# Patient Record
Sex: FEMALE | Race: WHITE | Employment: UNEMPLOYED | URBAN - METROPOLITAN AREA
[De-identification: names, ages, dates, MRNs, and addresses within clinical notes are randomized per-mention and may not be internally consistent; named-entity substitution may affect disease eponyms.]

---

## 2017-03-08 PROBLEM — R61 HYPERHIDROSIS: Status: ACTIVE | Noted: 2017-03-08

## 2017-03-15 PROCEDURE — 87624 HPV HI-RISK TYP POOLED RSLT: CPT | Performed by: OBSTETRICS & GYNECOLOGY

## 2017-03-15 PROCEDURE — 88175 CYTOPATH C/V AUTO FLUID REDO: CPT | Performed by: OBSTETRICS & GYNECOLOGY

## 2017-10-19 PROCEDURE — 87086 URINE CULTURE/COLONY COUNT: CPT | Performed by: NURSE PRACTITIONER

## 2017-11-10 PROCEDURE — 83001 ASSAY OF GONADOTROPIN (FSH): CPT | Performed by: OBSTETRICS & GYNECOLOGY

## 2017-11-10 PROCEDURE — 84146 ASSAY OF PROLACTIN: CPT | Performed by: OBSTETRICS & GYNECOLOGY

## 2017-11-28 PROCEDURE — 84144 ASSAY OF PROGESTERONE: CPT | Performed by: OBSTETRICS & GYNECOLOGY

## 2017-11-28 PROCEDURE — 36415 COLL VENOUS BLD VENIPUNCTURE: CPT | Performed by: OBSTETRICS & GYNECOLOGY

## 2018-02-09 PROBLEM — Z34.00 SUPERVISION OF NORMAL FIRST PREGNANCY, ANTEPARTUM: Status: ACTIVE | Noted: 2018-02-09

## 2018-02-09 PROBLEM — Z34.00 SUPERVISION OF NORMAL FIRST PREGNANCY, ANTEPARTUM (HCC): Status: ACTIVE | Noted: 2018-02-09

## 2018-02-09 PROCEDURE — 87591 N.GONORRHOEAE DNA AMP PROB: CPT | Performed by: OBSTETRICS & GYNECOLOGY

## 2018-02-09 PROCEDURE — 87491 CHLMYD TRACH DNA AMP PROBE: CPT | Performed by: OBSTETRICS & GYNECOLOGY

## 2018-02-16 PROCEDURE — 87389 HIV-1 AG W/HIV-1&-2 AB AG IA: CPT | Performed by: OBSTETRICS & GYNECOLOGY

## 2018-02-16 PROCEDURE — 85025 COMPLETE CBC W/AUTO DIFF WBC: CPT | Performed by: OBSTETRICS & GYNECOLOGY

## 2018-02-16 PROCEDURE — 86900 BLOOD TYPING SEROLOGIC ABO: CPT | Performed by: OBSTETRICS & GYNECOLOGY

## 2018-02-16 PROCEDURE — 86850 RBC ANTIBODY SCREEN: CPT | Performed by: OBSTETRICS & GYNECOLOGY

## 2018-02-16 PROCEDURE — 86901 BLOOD TYPING SEROLOGIC RH(D): CPT | Performed by: OBSTETRICS & GYNECOLOGY

## 2018-02-16 PROCEDURE — 86780 TREPONEMA PALLIDUM: CPT | Performed by: OBSTETRICS & GYNECOLOGY

## 2018-02-16 PROCEDURE — 36415 COLL VENOUS BLD VENIPUNCTURE: CPT | Performed by: OBSTETRICS & GYNECOLOGY

## 2018-02-16 PROCEDURE — 87086 URINE CULTURE/COLONY COUNT: CPT | Performed by: OBSTETRICS & GYNECOLOGY

## 2018-02-16 PROCEDURE — 86762 RUBELLA ANTIBODY: CPT | Performed by: OBSTETRICS & GYNECOLOGY

## 2018-02-16 PROCEDURE — 87340 HEPATITIS B SURFACE AG IA: CPT | Performed by: OBSTETRICS & GYNECOLOGY

## 2018-02-23 PROBLEM — Z67.91 RH NEGATIVE STATUS DURING PREGNANCY (HCC): Status: ACTIVE | Noted: 2018-02-23

## 2018-02-23 PROBLEM — Z67.91 RH NEGATIVE STATUS DURING PREGNANCY: Status: ACTIVE | Noted: 2018-02-23

## 2018-02-23 PROBLEM — O26.899 RH NEGATIVE STATUS DURING PREGNANCY (HCC): Status: ACTIVE | Noted: 2018-02-23

## 2018-02-23 PROBLEM — O26.899 RH NEGATIVE STATUS DURING PREGNANCY: Status: ACTIVE | Noted: 2018-02-23

## 2018-06-28 PROCEDURE — 82950 GLUCOSE TEST: CPT | Performed by: OBSTETRICS & GYNECOLOGY

## 2018-06-28 PROCEDURE — 87389 HIV-1 AG W/HIV-1&-2 AB AG IA: CPT | Performed by: OBSTETRICS & GYNECOLOGY

## 2018-06-28 PROCEDURE — 86780 TREPONEMA PALLIDUM: CPT | Performed by: OBSTETRICS & GYNECOLOGY

## 2018-06-28 PROCEDURE — 86850 RBC ANTIBODY SCREEN: CPT | Performed by: OBSTETRICS & GYNECOLOGY

## 2018-07-06 PROCEDURE — 82951 GLUCOSE TOLERANCE TEST (GTT): CPT | Performed by: OBSTETRICS & GYNECOLOGY

## 2018-07-06 PROCEDURE — 36415 COLL VENOUS BLD VENIPUNCTURE: CPT | Performed by: OBSTETRICS & GYNECOLOGY

## 2018-07-06 PROCEDURE — 82952 GTT-ADDED SAMPLES: CPT | Performed by: OBSTETRICS & GYNECOLOGY

## 2018-09-08 ENCOUNTER — HOSPITAL ENCOUNTER (OUTPATIENT)
Facility: HOSPITAL | Age: 33
Setting detail: OBSERVATION
Discharge: HOME | End: 2018-09-08
Attending: OBSTETRICS & GYNECOLOGY | Admitting: OBSTETRICS & GYNECOLOGY
Payer: COMMERCIAL

## 2018-09-08 VITALS
DIASTOLIC BLOOD PRESSURE: 76 MMHG | BODY MASS INDEX: 29.38 KG/M2 | TEMPERATURE: 98 F | SYSTOLIC BLOOD PRESSURE: 136 MMHG | RESPIRATION RATE: 20 BRPM | HEART RATE: 85 BPM | HEIGHT: 66.5 IN | WEIGHT: 185 LBS

## 2018-09-08 PROBLEM — Z34.90 PREGNANCY: Status: ACTIVE | Noted: 2018-09-08

## 2018-09-08 PROBLEM — Z34.90 PREGNANCY (HCC): Status: ACTIVE | Noted: 2018-09-08

## 2018-09-08 PROCEDURE — 84112 EVAL AMNIOTIC FLUID PROTEIN: CPT

## 2018-09-08 PROCEDURE — 59025 FETAL NON-STRESS TEST: CPT

## 2018-09-08 PROCEDURE — 99204 OFFICE O/P NEW MOD 45 MIN: CPT

## 2018-09-08 NOTE — PROGRESS NOTES
Sterile speculum exam revealed no fluid, no pooling. Amniotest and ferning negative. ROM+ negative. Discharge instructions given to pt and spouse, all questions answered. Pt discharged home via ambulatory in stable condition.

## 2018-09-08 NOTE — NST
Nonstress Test   Patient: Otoniel Barthel    Gestation: 37w2d    NST:       Variability: Moderate           Accelerations: Yes           Decelerations: None            Baseline: 125 BPM           Uterine Irritability: No           Contractions: Raj Stephenson

## 2018-10-02 ENCOUNTER — TELEPHONE (OUTPATIENT)
Dept: OBGYN UNIT | Facility: HOSPITAL | Age: 33
End: 2018-10-02

## 2018-10-03 ENCOUNTER — APPOINTMENT (OUTPATIENT)
Dept: OBGYN CLINIC | Facility: HOSPITAL | Age: 33
End: 2018-10-03
Payer: COMMERCIAL

## 2018-10-03 ENCOUNTER — ANESTHESIA EVENT (OUTPATIENT)
Dept: OBGYN UNIT | Facility: HOSPITAL | Age: 33
End: 2018-10-03
Payer: COMMERCIAL

## 2018-10-03 ENCOUNTER — ANESTHESIA (OUTPATIENT)
Dept: OBGYN UNIT | Facility: HOSPITAL | Age: 33
End: 2018-10-03
Payer: COMMERCIAL

## 2018-10-03 ENCOUNTER — HOSPITAL ENCOUNTER (INPATIENT)
Facility: HOSPITAL | Age: 33
LOS: 3 days | Discharge: HOME | End: 2018-10-06
Attending: OBSTETRICS & GYNECOLOGY | Admitting: OBSTETRICS & GYNECOLOGY
Payer: COMMERCIAL

## 2018-10-03 PROCEDURE — 59514 CESAREAN DELIVERY ONLY: CPT | Performed by: OBSTETRICS & GYNECOLOGY

## 2018-10-03 PROCEDURE — 3E0P7VZ INTRODUCTION OF HORMONE INTO FEMALE REPRODUCTIVE, VIA NATURAL OR ARTIFICIAL OPENING: ICD-10-PCS | Performed by: OBSTETRICS & GYNECOLOGY

## 2018-10-03 PROCEDURE — 3E033VJ INTRODUCTION OF OTHER HORMONE INTO PERIPHERAL VEIN, PERCUTANEOUS APPROACH: ICD-10-PCS | Performed by: OBSTETRICS & GYNECOLOGY

## 2018-10-03 RX ORDER — ONDANSETRON 2 MG/ML
4 INJECTION INTRAMUSCULAR; INTRAVENOUS EVERY 6 HOURS PRN
Status: DISCONTINUED | OUTPATIENT
Start: 2018-10-03 | End: 2018-10-06

## 2018-10-03 RX ORDER — NALOXONE HYDROCHLORIDE 0.4 MG/ML
0.08 INJECTION, SOLUTION INTRAMUSCULAR; INTRAVENOUS; SUBCUTANEOUS
Status: ACTIVE | OUTPATIENT
Start: 2018-10-03 | End: 2018-10-04

## 2018-10-03 RX ORDER — TERBUTALINE SULFATE 1 MG/ML
0.25 INJECTION, SOLUTION SUBCUTANEOUS AS NEEDED
Status: DISCONTINUED | OUTPATIENT
Start: 2018-10-03 | End: 2018-10-03 | Stop reason: HOSPADM

## 2018-10-03 RX ORDER — DEXTROSE, SODIUM CHLORIDE, SODIUM LACTATE, POTASSIUM CHLORIDE, AND CALCIUM CHLORIDE 5; .6; .31; .03; .02 G/100ML; G/100ML; G/100ML; G/100ML; G/100ML
INJECTION, SOLUTION INTRAVENOUS AS NEEDED
Status: DISCONTINUED | OUTPATIENT
Start: 2018-10-03 | End: 2018-10-03 | Stop reason: HOSPADM

## 2018-10-03 RX ORDER — ZOLPIDEM TARTRATE 5 MG/1
5 TABLET ORAL NIGHTLY PRN
Status: DISCONTINUED | OUTPATIENT
Start: 2018-10-03 | End: 2018-10-03 | Stop reason: HOSPADM

## 2018-10-03 RX ORDER — KETOROLAC TROMETHAMINE 30 MG/ML
30 INJECTION, SOLUTION INTRAMUSCULAR; INTRAVENOUS EVERY 6 HOURS PRN
Status: DISCONTINUED | OUTPATIENT
Start: 2018-10-04 | End: 2018-10-04 | Stop reason: ALTCHOICE

## 2018-10-03 RX ORDER — METOCLOPRAMIDE HYDROCHLORIDE 5 MG/ML
10 INJECTION INTRAMUSCULAR; INTRAVENOUS EVERY 6 HOURS PRN
Status: DISCONTINUED | OUTPATIENT
Start: 2018-10-03 | End: 2018-10-06

## 2018-10-03 RX ORDER — MEPERIDINE HYDROCHLORIDE 25 MG/ML
12.5 INJECTION INTRAMUSCULAR; INTRAVENOUS; SUBCUTANEOUS ONCE AS NEEDED
Status: COMPLETED | OUTPATIENT
Start: 2018-10-03 | End: 2018-10-03

## 2018-10-03 RX ORDER — SODIUM CHLORIDE, SODIUM LACTATE, POTASSIUM CHLORIDE, CALCIUM CHLORIDE 600; 310; 30; 20 MG/100ML; MG/100ML; MG/100ML; MG/100ML
INJECTION, SOLUTION INTRAVENOUS CONTINUOUS
Status: DISCONTINUED | OUTPATIENT
Start: 2018-10-03 | End: 2018-10-06

## 2018-10-03 RX ORDER — CEFAZOLIN SODIUM 1 G/3ML
INJECTION, POWDER, FOR SOLUTION INTRAMUSCULAR; INTRAVENOUS
Status: DISCONTINUED | OUTPATIENT
Start: 2018-10-03 | End: 2018-10-03 | Stop reason: HOSPADM

## 2018-10-03 RX ORDER — DIPHENHYDRAMINE HYDROCHLORIDE 50 MG/ML
25 INJECTION INTRAMUSCULAR; INTRAVENOUS ONCE AS NEEDED
Status: DISCONTINUED | OUTPATIENT
Start: 2018-10-03 | End: 2018-10-03 | Stop reason: HOSPADM

## 2018-10-03 RX ORDER — DIPHENHYDRAMINE HCL 25 MG
25 CAPSULE ORAL EVERY 4 HOURS PRN
Status: DISCONTINUED | OUTPATIENT
Start: 2018-10-03 | End: 2018-10-06

## 2018-10-03 RX ORDER — NALBUPHINE HCL 10 MG/ML
2.5 AMPUL (ML) INJECTION EVERY 4 HOURS PRN
Status: DISCONTINUED | OUTPATIENT
Start: 2018-10-03 | End: 2018-10-06

## 2018-10-03 RX ORDER — KETOROLAC TROMETHAMINE 30 MG/ML
30 INJECTION, SOLUTION INTRAMUSCULAR; INTRAVENOUS ONCE AS NEEDED
Status: COMPLETED | OUTPATIENT
Start: 2018-10-03 | End: 2018-10-03

## 2018-10-03 RX ORDER — CEFAZOLIN SODIUM/WATER 2 G/20 ML
2 SYRINGE (ML) INTRAVENOUS
Status: DISCONTINUED | OUTPATIENT
Start: 2018-10-03 | End: 2018-10-03 | Stop reason: HOSPADM

## 2018-10-03 RX ORDER — CEFAZOLIN SODIUM/WATER 2 G/20 ML
SYRINGE (ML) INTRAVENOUS
Status: DISPENSED
Start: 2018-10-03 | End: 2018-10-04

## 2018-10-03 RX ORDER — IBUPROFEN 600 MG/1
600 TABLET ORAL ONCE AS NEEDED
Status: DISCONTINUED | OUTPATIENT
Start: 2018-10-03 | End: 2018-10-03 | Stop reason: HOSPADM

## 2018-10-03 RX ORDER — SODIUM CHLORIDE, SODIUM LACTATE, POTASSIUM CHLORIDE, CALCIUM CHLORIDE 600; 310; 30; 20 MG/100ML; MG/100ML; MG/100ML; MG/100ML
INJECTION, SOLUTION INTRAVENOUS CONTINUOUS
Status: DISCONTINUED | OUTPATIENT
Start: 2018-10-03 | End: 2018-10-03 | Stop reason: HOSPADM

## 2018-10-03 RX ORDER — MORPHINE SULFATE 0.5 MG/ML
0.15 INJECTION, SOLUTION EPIDURAL; INTRATHECAL; INTRAVENOUS ONCE
Status: DISCONTINUED | OUTPATIENT
Start: 2018-10-03 | End: 2018-10-06

## 2018-10-03 RX ORDER — KETOROLAC TROMETHAMINE 30 MG/ML
INJECTION, SOLUTION INTRAMUSCULAR; INTRAVENOUS
Status: DISPENSED
Start: 2018-10-03 | End: 2018-10-04

## 2018-10-03 RX ORDER — TRISODIUM CITRATE DIHYDRATE AND CITRIC ACID MONOHYDRATE 500; 334 MG/5ML; MG/5ML
30 SOLUTION ORAL AS NEEDED
Status: DISCONTINUED | OUTPATIENT
Start: 2018-10-03 | End: 2018-10-03 | Stop reason: HOSPADM

## 2018-10-03 RX ORDER — DIPHENHYDRAMINE HYDROCHLORIDE 50 MG/ML
12.5 INJECTION INTRAMUSCULAR; INTRAVENOUS EVERY 4 HOURS PRN
Status: DISCONTINUED | OUTPATIENT
Start: 2018-10-03 | End: 2018-10-06

## 2018-10-03 RX ORDER — MORPHINE SULFATE 4 MG/ML
2 INJECTION, SOLUTION INTRAMUSCULAR; INTRAVENOUS EVERY 5 MIN PRN
Status: DISCONTINUED | OUTPATIENT
Start: 2018-10-03 | End: 2018-10-03 | Stop reason: HOSPADM

## 2018-10-03 RX ORDER — MORPHINE SULFATE 4 MG/ML
2 INJECTION, SOLUTION INTRAMUSCULAR; INTRAVENOUS EVERY 2 HOUR PRN
Status: ACTIVE | OUTPATIENT
Start: 2018-10-03 | End: 2018-10-04

## 2018-10-03 RX ORDER — ONDANSETRON 2 MG/ML
4 INJECTION INTRAMUSCULAR; INTRAVENOUS ONCE AS NEEDED
Status: DISCONTINUED | OUTPATIENT
Start: 2018-10-03 | End: 2018-10-03 | Stop reason: HOSPADM

## 2018-10-03 RX ORDER — NALBUPHINE HCL 10 MG/ML
2.5 AMPUL (ML) INJECTION
Status: DISCONTINUED | OUTPATIENT
Start: 2018-10-03 | End: 2018-10-03 | Stop reason: HOSPADM

## 2018-10-03 NOTE — PROGRESS NOTES
Pt is a 28year old female admitted to TRG2/TRG2-A, Patient presents with:  R/o Rom: \"This morning I noticed some leaking of cl fld. \"     Pt is 40w6d intra-uterine pregnancy. Reports +fetal movement. History obtained, consents signed.  Oriented to room,

## 2018-10-03 NOTE — PROGRESS NOTES
[facility]      Labor Progress Note    Jennie James Patient Status:  Inpatient    1985 MRN [id number]   Location [address] [facility] Attending Beena Pacheco MD   Hosp Day # 0 PCP Kevin Charles MD      SUBJECTIVE:    In

## 2018-10-03 NOTE — PROGRESS NOTES
[facility]      Labor Progress Note    Lum Barthel Patient Status:  Inpatient    1985 MRN [id number]   Location 1818 [facility] Attending Sai Pacheco MD   Hosp Day # 0 PCP Chica Degroot MD      SUBJECTIVE:    In

## 2018-10-04 PROCEDURE — 3E0234Z INTRODUCTION OF SERUM, TOXOID AND VACCINE INTO MUSCLE, PERCUTANEOUS APPROACH: ICD-10-PCS | Performed by: OBSTETRICS & GYNECOLOGY

## 2018-10-04 RX ORDER — HYDROCODONE BITARTRATE AND ACETAMINOPHEN 5; 325 MG/1; MG/1
1 TABLET ORAL EVERY 4 HOURS PRN
Status: DISCONTINUED | OUTPATIENT
Start: 2018-10-04 | End: 2018-10-06

## 2018-10-04 RX ORDER — SIMETHICONE 80 MG
80 TABLET,CHEWABLE ORAL 3 TIMES DAILY PRN
Status: DISCONTINUED | OUTPATIENT
Start: 2018-10-04 | End: 2018-10-06

## 2018-10-04 RX ORDER — ZOLPIDEM TARTRATE 5 MG/1
5 TABLET ORAL NIGHTLY PRN
Status: DISCONTINUED | OUTPATIENT
Start: 2018-10-04 | End: 2018-10-06

## 2018-10-04 RX ORDER — DOCUSATE SODIUM 100 MG/1
100 CAPSULE, LIQUID FILLED ORAL
Status: DISCONTINUED | OUTPATIENT
Start: 2018-10-04 | End: 2018-10-06

## 2018-10-04 RX ORDER — DEXTROSE, SODIUM CHLORIDE, SODIUM LACTATE, POTASSIUM CHLORIDE, AND CALCIUM CHLORIDE 5; .6; .31; .03; .02 G/100ML; G/100ML; G/100ML; G/100ML; G/100ML
INJECTION, SOLUTION INTRAVENOUS CONTINUOUS
Status: DISCONTINUED | OUTPATIENT
Start: 2018-10-04 | End: 2018-10-06

## 2018-10-04 RX ORDER — HYDROCODONE BITARTRATE AND ACETAMINOPHEN 10; 325 MG/1; MG/1
1 TABLET ORAL EVERY 4 HOURS PRN
Status: DISCONTINUED | OUTPATIENT
Start: 2018-10-04 | End: 2018-10-06

## 2018-10-04 RX ORDER — IBUPROFEN 600 MG/1
600 TABLET ORAL EVERY 6 HOURS SCHEDULED
Status: DISCONTINUED | OUTPATIENT
Start: 2018-10-05 | End: 2018-10-04

## 2018-10-04 RX ORDER — BISACODYL 10 MG
10 SUPPOSITORY, RECTAL RECTAL
Status: DISCONTINUED | OUTPATIENT
Start: 2018-10-04 | End: 2018-10-06

## 2018-10-04 RX ORDER — IBUPROFEN 600 MG/1
600 TABLET ORAL EVERY 6 HOURS SCHEDULED
Status: DISCONTINUED | OUTPATIENT
Start: 2018-10-04 | End: 2018-10-06

## 2018-10-04 RX ORDER — DOCUSATE SODIUM 100 MG/1
100 CAPSULE, LIQUID FILLED ORAL
Status: DISCONTINUED | OUTPATIENT
Start: 2018-10-05 | End: 2018-10-04

## 2018-10-04 NOTE — PROGRESS NOTES
[facility]    Patients Name: Manuel Franks  Attending Physician: Sal Gongora MD  CSN: [id number]    Location:  6222/2398-T  MRN: [id number]    YOB: 1985  Admission Date: 10/3/2018     Obstetric Anesthesia Pain Progress Note

## 2018-10-04 NOTE — OPERATIVE REPORT
[facility]  Post-Operative Note    Berenice Winters Patient Status:  Inpatient    1985 MRN [id number]   Location 1818 [facility] Attending Vangie Pacheco MD   Hosp Day # 0 PCP Andria Alvarez MD     Pre-operative Diagnosi The incision was extended using blunt dissection. The infant's head was delivered atraumatically. The remainder of the infant was delivered without difficulty. After the umbilical cord was clamped and cut. The cord blood was obtained for evaluation.  The

## 2018-10-04 NOTE — PROGRESS NOTES
NURSING ADMISSION NOTE      Patient admitted via Cart  Oriented to room. Safety precautions initiated. Bed in low position. Call light in reach. Discussed plan of care. Instructed to call RN for assistance and concerns.

## 2018-10-04 NOTE — ANESTHESIA PREPROCEDURE EVALUATION
PRE-OP EVALUATION    Patient Name: Faisal Marroquin    Pre-op Diagnosis: Intrauterine pregnancy  Fetal intolerance to labor    Procedure(s): Primary  Section      Surgeon(s) and Role:     * Sanjeev Gruber MD - Primary     * Carlos Borges Past Surgical History:  04/08/2011: COLPOSCOPY, CERVIX W/UPPER ADJACENT VAGINA; W/  ENDOCERVICAL CURETTAGE      Comment:   CIN1  08/2016: ORAL SURGERY SINGLE TOOTH      Comment:  wisdom tooth  Social History    Tobacco Use      Smo

## 2018-10-04 NOTE — PROGRESS NOTES
Pt transferred to Mother Baby room 2192 in stable condition. Report given to Alex, [place] Infant transferred with mother in stable condition.

## 2018-10-04 NOTE — ANESTHESIA POSTPROCEDURE EVALUATION
[facility] Milo Haggard Patient Status:  Inpatient   Age/Gender 28year old female MRN [id number]   Location 1818 [facility] Attending Lianna Pacheco MD   Hosp Day # 0 PCP Bobby Patterson MD       Anesthesia Post-op Note

## 2018-10-04 NOTE — PROGRESS NOTES
[facility]  Post-Partum Caesarean Section Progress Note    Cam Flavors Patient Status:  Inpatient    1985 MRN [id number]   [facility] 2SW-J Attending Yumi Ames MD   Hosp Day # 1 PCP Kalee Cherry MD     SUBJECTIV

## 2018-10-05 NOTE — PLAN OF CARE
POSTPARTUM    • Long Term Goal:Experiences normal postpartum course Progressing    • Optimize infant feeding at the breast Progressing    • Appropriate maternal -  bonding Progressing            Discussed POC with family, verbalized understanding

## 2018-10-05 NOTE — PROGRESS NOTES
S: pt without complaints.   Positive flatus  O: VS-Temp:  [98.2 °F (36.8 °C)-99.8 °F (37.7 °C)] 98.6 °F (37 °C)  Pulse:  [] 83  Resp:  [17-18] 17  BP: (121-134)/(66-73) 121/67       Abdomen: soft, ND, NT  Incision- CDI       Extremities: tr edema, NT

## 2018-10-05 NOTE — PLAN OF CARE
POSTPARTUM    • Establishment of adequate milk supply with medication/procedure interruptions Completed          POSTPARTUM    • Long Term Goal:Experiences normal postpartum course Progressing    • Optimize infant feeding at the breast Progressing    • Irena

## 2018-10-06 VITALS
HEART RATE: 75 BPM | BODY MASS INDEX: 29.89 KG/M2 | HEIGHT: 66 IN | TEMPERATURE: 99 F | OXYGEN SATURATION: 100 % | DIASTOLIC BLOOD PRESSURE: 80 MMHG | SYSTOLIC BLOOD PRESSURE: 130 MMHG | RESPIRATION RATE: 17 BRPM | WEIGHT: 186 LBS

## 2018-10-06 RX ORDER — HYDROCODONE BITARTRATE AND ACETAMINOPHEN 5; 325 MG/1; MG/1
1 TABLET ORAL EVERY 8 HOURS PRN
Qty: 20 TABLET | Refills: 0 | Status: SHIPPED | OUTPATIENT
Start: 2018-10-06 | End: 2018-10-17

## 2018-10-06 NOTE — DISCHARGE SUMMARY
[facility]  Discharge Summary    Faisal Marroquin Patient Status:  Inpatient    1985 MRN [id number]   [facility] [place] 2SW-J Attending Gloria Pacheco MD   Hosp Day # 3 PCP Lotus Smith MD     Date of Admission: 10/3/2018    Akhil

## 2018-10-06 NOTE — PROGRESS NOTES
[facility]  Post-Partum Caesarean Section Progress Note    Fabianamarylin Ramaner Patient Status:  Inpatient    1985 MRN [id number]   [facility] 2SW-J Attending Mary Hartman MD   Hosp Day # 3 PCP Elidia Denver, MD     SUBJECTIV

## 2018-10-09 ENCOUNTER — TELEPHONE (OUTPATIENT)
Dept: OBGYN UNIT | Facility: HOSPITAL | Age: 33
End: 2018-10-09

## 2018-10-09 NOTE — PROGRESS NOTES
Reviewed self and infant care w / mom, she verbalizes understanding of instructions reviewed. Encourage to follow up w/ MDs as directed and w/ questions/concerns. Enc to go to ct, all sx of PIH reviewed w good understanding.

## 2019-05-16 PROBLEM — Z34.00 SUPERVISION OF NORMAL FIRST PREGNANCY, ANTEPARTUM: Status: RESOLVED | Noted: 2018-02-09 | Resolved: 2019-05-16

## 2019-05-16 PROBLEM — Z34.00 SUPERVISION OF NORMAL FIRST PREGNANCY, ANTEPARTUM (HCC): Status: RESOLVED | Noted: 2018-02-09 | Resolved: 2019-05-16

## 2021-02-16 PROBLEM — Z34.90 PREGNANCY (HCC): Status: RESOLVED | Noted: 2018-09-08 | Resolved: 2021-02-16

## 2021-02-16 PROBLEM — Z34.90 PREGNANCY: Status: RESOLVED | Noted: 2018-09-08 | Resolved: 2021-02-16

## 2021-02-16 PROBLEM — F41.9 ANXIETY: Status: ACTIVE | Noted: 2021-02-16

## 2023-04-27 ENCOUNTER — LAB REQUISITION (OUTPATIENT)
Dept: LAB | Facility: HOSPITAL | Age: 38
End: 2023-04-27
Payer: COMMERCIAL

## 2023-04-27 DIAGNOSIS — N60.81 OTHER BENIGN MAMMARY DYSPLASIAS OF RIGHT BREAST: ICD-10-CM

## 2023-04-27 PROCEDURE — 88304 TISSUE EXAM BY PATHOLOGIST: CPT | Performed by: SURGERY

## 2024-03-06 LAB
AMB EXT HEPATITIS C VIRUS ANTIBODY: NEGATIVE
ANTIBODY SCREEN OB: POSITIVE
HEPATITIS B SURFACE ANTIGEN OB: NEGATIVE
HIV RESULT OB: NEGATIVE
RH FACTOR OB: NEGATIVE
T PALLIDUM ANTIBODIES: NEGATIVE

## 2024-05-15 ENCOUNTER — OFFICE VISIT (OUTPATIENT)
Dept: PERINATAL CARE | Facility: HOSPITAL | Age: 39
End: 2024-05-15
Attending: OBSTETRICS & GYNECOLOGY

## 2024-05-15 VITALS
HEIGHT: 67 IN | HEART RATE: 91 BPM | SYSTOLIC BLOOD PRESSURE: 106 MMHG | WEIGHT: 152 LBS | DIASTOLIC BLOOD PRESSURE: 64 MMHG | BODY MASS INDEX: 23.86 KG/M2

## 2024-05-15 DIAGNOSIS — O34.219 HISTORY OF CESAREAN DELIVERY AFFECTING PREGNANCY (HCC): ICD-10-CM

## 2024-05-15 DIAGNOSIS — O09.529 AMA (ADVANCED MATERNAL AGE) MULTIGRAVIDA 35+ (HCC): ICD-10-CM

## 2024-05-15 DIAGNOSIS — O09.522 MULTIGRAVIDA OF ADVANCED MATERNAL AGE IN SECOND TRIMESTER (HCC): Primary | ICD-10-CM

## 2024-05-15 PROCEDURE — 76811 OB US DETAILED SNGL FETUS: CPT | Performed by: OBSTETRICS & GYNECOLOGY

## 2024-05-15 NOTE — PROGRESS NOTES
Outpatient Maternal-Fetal Medicine Consultation    Dear Dr. Ramos    Thank you for requesting ultrasound evaluation and maternal fetal medicine consultation on your patient Jeaneth Connelly.  As you are aware she is a 39 year old female  with a morejon pregnancy and an Estimated Date of Delivery: 24.  A maternal-fetal medicine consultation was requested secondary to Advanced Maternal Age.  Her prenatal records and labs were reviewed.    ROS    HISTORY  OB History    Para Term  AB Living   2 1 1 0 0 1   SAB IAB Ectopic Multiple Live Births   0 0 0 0 1      # Outcome Date GA Lbr Huy/2nd Weight Sex Type Anes PTL Lv   2 Current            1 Term 10/03/18 40w6d  9 lb 2 oz (4.14 kg) M CS-LTranv Spinal N PRASANNA      Complications: Fetal intolerance to labor       Allergies:  Allergies   Allergen Reactions    Clindamycin HIVES    Dicloxacillin DIARRHEA      Current Meds:  Current Outpatient Medications   Medication Sig Dispense Refill    Prenatal Vit w/Ev-Daalpyxuk-HR (PNV OR) Take by mouth daily.      Ergocalciferol (VITAMIN D OR) Take by mouth. (Patient not taking: Reported on 5/15/2024)      Clobetasol Propionate 0.05 % External Cream Apply to AA's of hands and feet BID x 2 weeks. Hold x 1 week. Restart PRN. (Patient not taking: Reported on 2021 ) 60 g 2        HISTORY:  Past Medical History:    Chlamydia    SARAH I (cervical intraepithelial neoplasia I)    Frequent UTI    LGSIL on Pap smear of cervix      Past Surgical History:   Procedure Laterality Date      10/03/2018    Colposcopy,bx cervix/endocerv curr  2011     CIN1    [place] teeth removed  2016      Family History   Problem Relation Age of Onset    Other (Thyroid Cancer [Other]) Father 60    Hypertension Father     Breast Cancer Maternal Grandmother         dx late 60's    Other (Hodgkin's Lymphoma [Other]) Mother 33    Depression Mother         depression    Stroke Maternal Grandfather 39        Comp from back  surgery    Skin cancer Paternal Grandfather 86    Heart Disorder Paternal Grandfather 80        Heart attack    Hypertension Paternal Grandfather     Other (Stomach Cancer [Other]) Paternal Grandfather       Social History     Socioeconomic History    Marital status:    Occupational History    Occupation: national response specialist   Tobacco Use    Smoking status: Former     Current packs/day: 0.00     Types: Cigarettes     Start date: 1/1/2007     Quit date: 1/1/2009     Years since quitting: 15.3    Smokeless tobacco: Never   Vaping Use    Vaping status: Never Used   Substance and Sexual Activity    Alcohol use: No     Alcohol/week: 6.0 standard drinks of alcohol     Types: 6 Standard drinks or equivalent per week    Drug use: No    Sexual activity: Not Currently     Partners: Male   Other Topics Concern    Blood Transfusions No    Caffeine Concern No     Comment: 3 cups per day    Sleep Concern No    Stress Concern No    Weight Concern No    Exercise Yes    Self-Exams Yes     Social Determinants of Health      Received from [facility]    Housing Stability          PHYSICAL EXAMINATION:  /64 (BP Location: Right arm, Patient Position: Sitting, Cuff Size: adult)   Pulse 91   Ht 5' 7\" (1.702 m)   Wt 152 lb (68.9 kg)   BMI 23.81 kg/m²   Physical Exam  Constitutional:       Appearance: Normal appearance.   Abdominal:      Palpations: Abdomen is soft.      Tenderness: There is no abdominal tenderness.   Neurological:      Mental Status: She is alert.   Psychiatric:         Mood and Affect: Mood normal.         Behavior: Behavior normal.         OBSTETRIC ULTRASOUND  The patient had a level II ultrasound today which revealed size consistent with dates and a normal detailed anatomic survey.  Ultrasound Findings:  Single IUP in breech presentation.    Placenta is posterior.   A 3 vessel cord is noted.  Cardiac activity is present at 156 bpm   g ( 1 lb 0 oz);   MVP is 3.9 cm .      The fetal measurements are consistent with the established EDC. No ultrasound evidence of structural abnormalities are seen today. The nasal bone is present. No ultrasound evidence of markers for aneuploidy are seen. She understands that ultrasound exam cannot exclude genetic abnormalities and that genetic testing is recommended. The limitations of ultrasound were discussed.     Uterus and adnexa appeared normal  today on US  See PACS/Imaging Tab For Complete Ultrasound Report  I interpreted the results and reviewed them with the patient.    DISCUSSION  During her visit we discussed and reviewed the following issues:  ADVANCED MATERNAL AGE    Background  I reviewed with the patient that pregnancies in women of advanced maternal age (35 or older at delivery) are associated with elevated risks. Specifically, there is a higher rate of:  Fetal malformations  Preeclampsia  Gestational diabetes  Intrauterine fetal death    As a result, enhanced pregnancy surveillance is advised for these patients including a comprehensive ultrasound to assess for fetal malformations (at 20 weeks) and a third trimester ultrasound assessment for fetal growth (at 32 weeks). In addition, weekly NST's (initiating at 36 weeks gestation for women 35-39 years and at 32 weeks gestation for women 40 years and older) are also advised. Routine obstetric care is more than adequate to assess for gestational diabetes and preeclampsia; hence, no further significant alterations in obstetric care are advised.    Medical Complications    Women 35 years of age or older can expect to experience two to three fold higher rates of hospitalization,  delivery, and pregnancy-related complications when compared to their younger counterparts.  The two most common medical problems complicating these  pregnanccies are hypertension and diabetes.   The incidence of preeclampsia in the general obstetric population is 3 to 4 percent; this increases to 5 to 10  percent in women over age 40 and is as high as 35 percent in women over age 50.   The incidence of gestational diabetes in the general obstetric population is 3 percent, rising to 7 to 12 percent in women over age 40 and 20 percent in women over age 50.  Women 35 years of age or older are more likely to be delivered by . The  delivery rate in the general obstetric population of the [place] is almost 30 percent, compared to almost 50 percent in women over age 40 to 45 and almost 80 percent in women age 50 to 63.          Fetal Death        A decision analysis tool using data from the [place] [employer]  Database predicted a strategy of weekly antepartum testing and labor induction would lower the risk of unexplained fetal death in women 35 years of age or older. In this model, weekly testing starting at 36 weeks of gestation would drop the risk of fetal death from 5.2 to 1.3 per 1000 pregnancies. While a policy of antepartum testing in older women does increase the chance that a women will be induced (71 inductions per fetal death averted) and thereby increases her risk of having a  delivery, only 14 additional cesareans would need to be performed to avert one unexplained fetal death.  Hence, weekly NST's are advised for women of advanced maternal age; testing should be initiated at 36 weeks for women 35-39 years and at 32 weeks for women 40 years and older.    Fetal Malformations    Cardiac malformations, clubfoot, and diaphragmatic hernia appear to occur with increased frequency in offspring of older women. These abnormalities are structural and unrelated to aneuploidy, thus they would not be detected by karyotype analysis.  For these reasons a complete, detailed ultrasound (level II) is advised even if the fetus has a normal karyotype.      Fetal Aneuploidy      Invasive Testing  I offered invasive genetic testing (amniocentesis, chorionic villus sampling) after reviewing the  diagnostic accuracy of these tests as well as the procedure associated loss rate (1:500 for genetic amniocentesis).    She ultimately does not desire invasive genetic testing.     We discussed  the increased risk of chromosomal abnormalities associated with advanced maternal age at age  39 year old. She understands that ultrasound exam cannot exclude potential genetic abnormalities.  Her estimated risk based on maternal age at term with any chromosome abnormality is about 1: 65 and with Down Syndrome is about 1: 85.   We also discussed the risks and benefits of having  genetic testing ([facility] and amniocentesis) performed.      Non-invasive Pregnancy Testing (NIPT)  I reviewed current non-invasive screening options. Currently non-invasive pregnancy testing (NIPT) offers the highest detection rate (with the lowest false positive rate) for the detection of fetal aneuploidy amongst high-risk patients. The limitations of detailed mid-trimester sonography was reviewed with the patient. First trimester screening and second trimester multiple-marker serum serum screening as alternative aneuploidy screening options were also reviewed. However, both of these tests are associated with lower detection and higher false positive rates.              IMPRESSION:  IUP at 20w5d   AMA --NIPT low risk, declines invasive testing   Prior  x 1    RECOMMENDATIONS:  Continue care with Dr. Ramos  Follow-up Growth ultrasound with BPP at 32 weeks.  Weekly NST's at 32 weeks.  Twice weekly testing at 38 weeks - weekly NST and weekly BPP.  Delivery at 39-40 weeks.            Thank you for allowing me to participate in the care of your patient.  Please do not hesitate to contact me if additional questions or concerns arise.      Jill Aden M.D.    30 minutes spent in review of records, patient consultation, documentation and coordination of care.  The relevant clinical matter(s) are summarized above.     Note to patient and family  The 21st  Century Cures Act makes medical notes available to patients in the interest of transparency.  However, please be advised that this is a medical document.  It is intended as [id number] communication.  It is written and medical language may contain abbreviations or verbiage that are technical and unfamiliar.  It may appear blunt or direct.  Medical documents are intended to carry relevant information, facts as evident, and the clinical opinion of the practitioner.

## 2024-06-24 ENCOUNTER — HOSPITAL ENCOUNTER (OUTPATIENT)
Facility: HOSPITAL | Age: 39
Discharge: HOME | End: 2024-06-24
Attending: OBSTETRICS & GYNECOLOGY | Admitting: OBSTETRICS & GYNECOLOGY

## 2024-06-24 VITALS
TEMPERATURE: 98 F | HEART RATE: 98 BPM | HEIGHT: 67 IN | BODY MASS INDEX: 24.01 KG/M2 | WEIGHT: 153 LBS | SYSTOLIC BLOOD PRESSURE: 119 MMHG | RESPIRATION RATE: 18 BRPM | DIASTOLIC BLOOD PRESSURE: 70 MMHG

## 2024-06-24 PROCEDURE — 99214 OFFICE O/P EST MOD 30 MIN: CPT

## 2024-06-24 NOTE — PROGRESS NOTES
Pt is a 39 year old female admitted to TRG2/TRG2-A.     Chief Complaint   Patient presents with    R/o  Labor    R/o Rom      Pt is  26w3d intra-uterine pregnancy.  History obtained, consents signed. Oriented to room, staff, and plan of care.    Pt reports \"over last 4.5 hours, I have been experiencing cramping approx every 5min and at 3pm I noticed some wetness in my panties that did not smell of urine.\"   Pt states 2 contractions were a 4/10 on pain scale.  Denies any vaginal bldg, reports +FM.

## 2024-06-25 NOTE — NST
Nonstress Test   Patient: Jeaneth Connelly    Gestation: 26w3d    NST:       Variability: Moderate           Accelerations: Yes           Decelerations: None            Baseline: 145 BPM           Uterine Irritability: Yes           Contractions: Not present                                        Acoustic Stimulator: No           Nonstress Test Interpretation: Appropriate for gestational age                                 Additional Comments

## 2024-06-25 NOTE — DISCHARGE INSTRUCTIONS
Discharge Instructions    Diet: Regular  Activity: Normal activity         General Instructions    Call your OB doctor if: Fluid leaking from your vagina;Decrease in fetal movement;Vaginal or rectal pressure;Uterine contractions 10 minutes or closer for 1 to 2 hours;Vaginal bleeding;Uterine contractions increasing in intensity and frequency;Temperature greater than 100F  Early labor comfort measures: Drink fluids and eat small light meals;Take a walk;Relax, sleep, take a warm bath or shower for 30 minutes or less

## 2024-06-25 NOTE — PROGRESS NOTES
[facility]  Labor and Delivery Prenatal History and Physical Interval Addendum/Triage assessment  Please see full Prenatal Record for this pregnancy      SUBJECTIVE:    Interval History:     This is a pregnancy at 26 weeks who presents to Labor and Delivery triage for complaints of:  - about 10 d of feeling more pelvic pressure than she was previously used to  - felt 2 contractions today, and some cramps  - felt some mild sense of increased wetness; no pad use, no underwear changes, no feeling of anything thin running down legs. She thought is could be sweat.    Denies dysuria, vaginal bleeding, decreased fetal movement.  Hx include term 9 lb infant del by c/s for fetal intolerance of labor.     OBJECTIVE:    The patient appears comfortable    Fetal Surveillance:  AGA, no contraction pattern , accels noted.     RN exam while I was in delivery in another room:  Mucoid discharge in vault. Neg thin pool, neg amnitest, neg ferning. No blood    Abd; soft, gravid, NT    Cervix:  Dilation:Closed  Effacement:Long  Station:Floating  Posterior and firm    ASSESSMENT/PLAN:    No evidence of PPROM  No evidence of labor  Reviewed s/sx PPROM, PTL,

## 2024-07-03 LAB — HIV ANTIGEN-ANTIBODY QUAL: NONREACTIVE

## 2024-08-07 ENCOUNTER — OFFICE VISIT (OUTPATIENT)
Dept: PERINATAL CARE | Facility: HOSPITAL | Age: 39
End: 2024-08-07
Attending: OBSTETRICS & GYNECOLOGY
Payer: COMMERCIAL

## 2024-08-07 VITALS
HEART RATE: 111 BPM | DIASTOLIC BLOOD PRESSURE: 61 MMHG | BODY MASS INDEX: 27.62 KG/M2 | WEIGHT: 176 LBS | HEIGHT: 67 IN | SYSTOLIC BLOOD PRESSURE: 106 MMHG

## 2024-08-07 DIAGNOSIS — O09.523 MULTIGRAVIDA OF ADVANCED MATERNAL AGE IN THIRD TRIMESTER (HCC): Primary | ICD-10-CM

## 2024-08-07 DIAGNOSIS — O09.529 AMA (ADVANCED MATERNAL AGE) MULTIGRAVIDA 35+ (HCC): ICD-10-CM

## 2024-08-07 PROCEDURE — 76819 FETAL BIOPHYS PROFIL W/O NST: CPT

## 2024-08-07 PROCEDURE — 76816 OB US FOLLOW-UP PER FETUS: CPT | Performed by: OBSTETRICS & GYNECOLOGY

## 2024-08-07 RX ORDER — FERROUS SULFATE 325(65) MG
325 TABLET, DELAYED RELEASE (ENTERIC COATED) ORAL EVERY OTHER DAY
COMMUNITY

## 2024-08-07 NOTE — PROGRESS NOTES
Outpatient Maternal-Fetal Medicine Consultation    Dear Dr. Ramos    Thank you for requesting ultrasound evaluation and maternal fetal medicine consultation on your patient Jeaneth Connelly.  As you are aware she is a 39 year old female  with a morejon pregnancy and an Estimated Date of Delivery: 24.  A maternal-fetal medicine  f/u is today.  Her prenatal records and labs were reviewed.    Feeling well.  Passed glucose screen.  ROS    HISTORY  OB History    Para Term  AB Living   2 1 1 0 0 1   SAB IAB Ectopic Multiple Live Births   0 0 0 0 1      # Outcome Date GA Lbr Huy/2nd Weight Sex Type Anes PTL Lv   2 Current            1 Term 10/03/18 40w6d  9 lb 2 oz (4.14 kg) M CS-LTranv Spinal N PRASANNA      Complications: Fetal intolerance to labor       Allergies:  Allergies   Allergen Reactions    Clindamycin HIVES    Azithromycin RASH    Dicloxacillin DIARRHEA      Current Meds:  Current Outpatient Medications   Medication Sig Dispense Refill    ferrous sulfate 325 (65 FE) MG Oral Tab EC Take 1 tablet (325 mg total) by mouth every other day.      Prenatal Vit w/Ry-Omwezxcbm-XV (PNV OR) Take by mouth daily.      Ergocalciferol (VITAMIN D OR) Take by mouth. (Patient not taking: Reported on 5/15/2024)      Clobetasol Propionate 0.05 % External Cream Apply to AA's of hands and feet BID x 2 weeks. Hold x 1 week. Restart PRN. (Patient not taking: Reported on 2021 ) 60 g 2        HISTORY:  Past Medical History:    Chlamydia    SARAH I (cervical intraepithelial neoplasia I)    Frequent UTI    LGSIL on Pap smear of cervix      Past Surgical History:   Procedure Laterality Date      10/03/2018    Colposcopy,bx cervix/endocerv curr  2011     CIN1    [place] teeth removed  2016      Family History   Problem Relation Age of Onset    Other (Thyroid Cancer [Other]) Father 60    Hypertension Father     Breast Cancer Maternal Grandmother         dx late 60's    Other (Hodgkin's Lymphoma  [Other]) Mother 33    Depression Mother         depression    Stroke Maternal Grandfather 39        Comp from back surgery    Skin cancer Paternal Grandfather 86    Heart Disorder Paternal Grandfather 80        Heart attack    Hypertension Paternal Grandfather     Other (Stomach Cancer [Other]) Paternal Grandfather       Social History     Socioeconomic History    Marital status:    Occupational History    Occupation: national response specialist   Tobacco Use    Smoking status: Former     Current packs/day: 0.00     Types: Cigarettes     Start date: 1/1/2007     Quit date: 1/1/2009     Years since quitting: 15.6    Smokeless tobacco: Never   Vaping Use    Vaping status: Never Used   Substance and Sexual Activity    Alcohol use: No     Alcohol/week: 6.0 standard drinks of alcohol     Types: 6 Standard drinks or equivalent per week    Drug use: No    Sexual activity: Not Currently     Partners: Male   Other Topics Concern    Blood Transfusions No    Caffeine Concern No     Comment: 3 cups per day    Sleep Concern No    Stress Concern No    Weight Concern No    Exercise Yes    Self-Exams Yes     Social Determinants of Health     Financial Resource Strain: Low Risk  (6/24/2024)    Financial Resource Strain     Difficulty of Paying Living Expenses: Not hard at all     Med Affordability: No   Food Insecurity: No Food Insecurity (6/24/2024)    Food Insecurity     Food Insecurity: Never true   Transportation Needs: No Transportation Needs (6/24/2024)    Transportation Needs     Lack of Transportation: No     Car Seat: Yes   Stress: No Stress Concern Present (6/24/2024)    Stress     Feeling of Stress : No   Housing Stability: Low Risk  (6/24/2024)    Housing Stability     Housing Instability: No     Crib or Bassinette: Yes          PHYSICAL EXAMINATION:  /61 (BP Location: Right arm, Patient Position: Sitting, Cuff Size: adult)   Pulse 111   Ht 5' 7\" (1.702 m)   Wt 176 lb (79.8 kg)   BMI 27.57 kg/m²    Physical Exam  Constitutional:       Appearance: Normal appearance.   Abdominal:      Palpations: Abdomen is soft.      Tenderness: There is no abdominal tenderness.   Neurological:      Mental Status: She is alert.   Psychiatric:         Mood and Affect: Mood normal.         Behavior: Behavior normal.         OBSTETRIC ULTRASOUND  The patient had a follow-up growth and BPP ultrasound today which revealed normal interval fetal growth and a BPP of 8/8.   Ultrasound Findings:  Single IUP in cephalic presentation.    Placenta is posterior.   A 3 vessel cord is noted.  Cardiac activity is present at 151 bpm  EFW 2596 g ( 5 lb 12 oz); 81%.    GRADY is  27.2 cm.  MVP is 10.5 cm  BPP is 8/8.     The fetal measurements are consistent with established EDC. No gross ultrasound evidence of structural abnormalities are seen today. The patient understands that ultrasound cannot rule out all structural and chromosomal abnormalities.   See PACS/Imaging Tab For Complete Ultrasound Report  I interpreted the results and reviewed them with the patient.    DISCUSSION  During her visit we discussed and reviewed the following issues:  ADVANCED MATERNAL AGE    Background  I reviewed with the patient that pregnancies in women of advanced maternal age (35 or older at delivery) are associated with elevated risks. Specifically, there is a higher rate of:  Fetal malformations  Preeclampsia  Gestational diabetes  Intrauterine fetal death       Please see previous MFM detailed discussion.           1hr gtt  137  AC at 99%.  Recommend 3 hr gtt.    IMPRESSION:  IUP at 32w5d   AMA --NIPT low risk, declines invasive testing   Prior  x 1    RECOMMENDATIONS:  Continue care with Dr. Ramos  Weekly NST's at 32 weeks.  Twice weekly testing at 38 weeks - weekly NST and weekly BPP.  Delivery at 39-40 weeks.  Recommend 3 hr gtt.            Thank you for allowing me to participate in the care of your patient.  Please do not hesitate to contact me if  additional questions or concerns arise.      Jill Aden M.D.    30 minutes spent in review of records, patient consultation, documentation and coordination of care.  The relevant clinical matter(s) are summarized above.     Note to patient and family  The 21st Century Cures Act makes medical notes available to patients in the interest of transparency.  However, please be advised that this is a medical document.  It is intended as squc-yl-hbzi communication.  It is written and medical language may contain abbreviations or verbiage that are technical and unfamiliar.  It may appear blunt or direct.  Medical documents are intended to carry relevant information, facts as evident, and the clinical opinion of the practitioner.

## 2024-08-07 NOTE — PROGRESS NOTES
Pt here for Growth Ultrasound/BPP  +fm noted per patient  Pt noted N&V w/onset this morning (brushing teeth).  Pt had nausea 1st trimester

## 2024-09-04 ENCOUNTER — OFFICE VISIT (OUTPATIENT)
Dept: PERINATAL CARE | Facility: HOSPITAL | Age: 39
End: 2024-09-04
Attending: OBSTETRICS & GYNECOLOGY
Payer: COMMERCIAL

## 2024-09-04 ENCOUNTER — TELEPHONE (OUTPATIENT)
Dept: OBGYN UNIT | Facility: HOSPITAL | Age: 39
End: 2024-09-04

## 2024-09-04 VITALS
HEART RATE: 90 BPM | DIASTOLIC BLOOD PRESSURE: 62 MMHG | HEIGHT: 67 IN | SYSTOLIC BLOOD PRESSURE: 118 MMHG | BODY MASS INDEX: 28.41 KG/M2 | WEIGHT: 181 LBS

## 2024-09-04 DIAGNOSIS — O34.219 HISTORY OF CESAREAN DELIVERY AFFECTING PREGNANCY (HCC): ICD-10-CM

## 2024-09-04 DIAGNOSIS — O40.3XX0 POLYHYDRAMNIOS IN THIRD TRIMESTER COMPLICATION, SINGLE OR UNSPECIFIED FETUS (HCC): Primary | ICD-10-CM

## 2024-09-04 DIAGNOSIS — O09.529 AMA (ADVANCED MATERNAL AGE) MULTIGRAVIDA 35+ (HCC): ICD-10-CM

## 2024-09-04 DIAGNOSIS — O09.523 MULTIGRAVIDA OF ADVANCED MATERNAL AGE IN THIRD TRIMESTER (HCC): ICD-10-CM

## 2024-09-04 DIAGNOSIS — O09.523 MULTIGRAVIDA OF ADVANCED MATERNAL AGE IN THIRD TRIMESTER (HCC): Primary | ICD-10-CM

## 2024-09-04 DIAGNOSIS — O36.63X0 EXCESSIVE FETAL GROWTH AFFECTING MANAGEMENT OF PREGNANCY IN THIRD TRIMESTER, SINGLE OR UNSPECIFIED FETUS (HCC): ICD-10-CM

## 2024-09-04 PROCEDURE — 76816 OB US FOLLOW-UP PER FETUS: CPT | Performed by: OBSTETRICS & GYNECOLOGY

## 2024-09-04 PROCEDURE — 76819 FETAL BIOPHYS PROFIL W/O NST: CPT

## 2024-09-04 NOTE — PROGRESS NOTES
Outpatient Maternal-Fetal Medicine Consultation    Dear Dr. Ramos    Thank you for requesting ultrasound evaluation and maternal fetal medicine consultation on your patient Jeaneth Connelly.  As you are aware she is a 39 year old female  with a morejon pregnancy and an Estimated Date of Delivery: 24.  A maternal-fetal medicine  f/u is today.  Her prenatal records and labs were reviewed.    3hr gtt has not been done.  Feeling overall well.  ROS    HISTORY  OB History    Para Term  AB Living   2 1 1 0 0 1   SAB IAB Ectopic Multiple Live Births   0 0 0 0 1      # Outcome Date GA Lbr Huy/2nd Weight Sex Type Anes PTL Lv   2 Current            1 Term 10/03/18 40w6d  9 lb 2 oz (4.14 kg) M CS-LTranv Spinal N PRASANNA      Complications: Fetal intolerance to labor       Allergies:  Allergies   Allergen Reactions    Clindamycin HIVES    Azithromycin RASH    Dicloxacillin DIARRHEA      Current Meds:  Current Outpatient Medications   Medication Sig Dispense Refill    ferrous sulfate 325 (65 FE) MG Oral Tab EC Take 1 tablet (325 mg total) by mouth every other day.      Prenatal Vit w/Le-Oeselzxty-IP (PNV OR) Take by mouth daily.      Ergocalciferol (VITAMIN D OR) Take by mouth. (Patient not taking: Reported on 5/15/2024)      Clobetasol Propionate 0.05 % External Cream Apply to AA's of hands and feet BID x 2 weeks. Hold x 1 week. Restart PRN. (Patient not taking: Reported on 2021 ) 60 g 2        HISTORY:  Past Medical History:    Chlamydia    SARAH I (cervical intraepithelial neoplasia I)    Frequent UTI    LGSIL on Pap smear of cervix      Past Surgical History:   Procedure Laterality Date      10/03/2018    Colposcopy,bx cervix/endocerv curr  2011     CIN1    [place] teeth removed  2016      Family History   Problem Relation Age of Onset    Other (Thyroid Cancer [Other]) Father 60    Hypertension Father     Breast Cancer Maternal Grandmother         dx late 60's    Other (Hodgkin's  Lymphoma [Other]) Mother 33    Depression Mother         depression    Stroke Maternal Grandfather 39        Comp from back surgery    Skin cancer Paternal Grandfather 86    Heart Disorder Paternal Grandfather 80        Heart attack    Hypertension Paternal Grandfather     Other (Stomach Cancer [Other]) Paternal Grandfather       Social History     Socioeconomic History    Marital status:    Occupational History    Occupation: national response specialist   Tobacco Use    Smoking status: Former     Current packs/day: 0.00     Types: Cigarettes     Start date: 1/1/2007     Quit date: 1/1/2009     Years since quitting: 15.6    Smokeless tobacco: Never   Vaping Use    Vaping status: Never Used   Substance and Sexual Activity    Alcohol use: No     Alcohol/week: 6.0 standard drinks of alcohol     Types: 6 Standard drinks or equivalent per week    Drug use: No    Sexual activity: Not Currently     Partners: Male   Other Topics Concern    Blood Transfusions No    Caffeine Concern No     Comment: 3 cups per day    Sleep Concern No    Stress Concern No    Weight Concern No    Exercise Yes    Self-Exams Yes     Social Determinants of Health     Financial Resource Strain: Low Risk  (6/24/2024)    Financial Resource Strain     Difficulty of Paying Living Expenses: Not hard at all     Med Affordability: No   Food Insecurity: No Food Insecurity (6/24/2024)    Food Insecurity     Food Insecurity: Never true   Transportation Needs: No Transportation Needs (6/24/2024)    Transportation Needs     Lack of Transportation: No     Car Seat: Yes   Stress: No Stress Concern Present (6/24/2024)    Stress     Feeling of Stress : No   Housing Stability: Low Risk  (6/24/2024)    Housing Stability     Housing Instability: No     Crib or Bassinette: Yes          PHYSICAL EXAMINATION:  /62 (BP Location: Right arm, Patient Position: Sitting, Cuff Size: adult)   Pulse 90   Ht 5' 7\" (1.702 m)   Wt 181 lb (82.1 kg)   BMI 28.35 kg/m²    Physical Exam  Constitutional:       Appearance: Normal appearance.   Abdominal:      Palpations: Abdomen is soft.      Tenderness: There is no abdominal tenderness.   Neurological:      Mental Status: She is alert.   Psychiatric:         Mood and Affect: Mood normal.         Behavior: Behavior normal.           OBSTETRIC ULTRASOUND  The patient had a follow-up growth and BPP ultrasound today which revealed normal interval fetal growth and a BPP of 8/8.   Ultrasound Findings:  Single IUP in cephalic presentation.    Placenta is posterior.   A 3 vessel cord is noted.  Cardiac activity is present at 123 bpm  EFW 3672 g ( 8 lb 2 oz); 91%.    GRADY is  15.3 cm.  MVP is 6.4 cm  BPP is 8/8.     The fetal measurements are consistent with large for gestational age fetus. No gross ultrasound evidence of structural abnormalities are seen today. The patient understands that ultrasound cannot rule out all structural and chromosomal abnormalities.   See PACS/Imaging Tab For Complete Ultrasound Report  I interpreted the results and reviewed them with the patient.    DISCUSSION  During her visit we discussed and reviewed the following issues:  ADVANCED MATERNAL AGE    Background  I reviewed with the patient that pregnancies in women of advanced maternal age (35 or older at delivery) are associated with elevated risks. Specifically, there is a higher rate of:  Fetal malformations  Preeclampsia  Gestational diabetes  Intrauterine fetal death       Please see previous MFM detailed discussion.     Polyhydramnios discussed at visit  weeks ago.  Could be due to beg fetus or undiagnosed GDM.      1hr gtt  137  Large for gestational age fetus with polyhydramnios in AMA patient .  Recommend 3 hr gtt.      IMPRESSION:  IUP at 36w5d   AMA --NIPT low risk, declines invasive testing   Prior  x 1  Mild polyhydramnios    RECOMMENDATIONS:  Continue care with Dr. Ramos  Weekly NST's at 32 weeks.  Twice weekly testing at 38 weeks - weekly  NST and weekly BPP.  Delivery at 39-40 weeks.  Recommend 3 hr gtt. Alternatively, could consider glucose check on baby if  weight under what would meet criteria for glucose checks by weight.        Thank you for allowing me to participate in the care of your patient.  Please do not hesitate to contact me if additional questions or concerns arise.      Jill Aden M.D.    30 minutes spent in review of records, patient consultation, documentation and coordination of care.  The relevant clinical matter(s) are summarized above.     Note to patient and family  The 21st Century Cures Act makes medical notes available to patients in the interest of transparency.  However, please be advised that this is a medical document.  It is intended as [id number]-cb-lxzj communication.  It is written and medical language may contain abbreviations or verbiage that are technical and unfamiliar.  It may appear blunt or direct.  Medical documents are intended to carry relevant information, facts as evident, and the clinical opinion of the practitioner.

## 2024-09-04 NOTE — PROGRESS NOTES
Pt here for growth ultrasound/BPP  + FM  Pt states feeling occas BH ctx, denies vag bleeding. Leaking lfuid and regular uterine ctx.

## 2024-09-05 NOTE — PROGRESS NOTES
Outpatient Maternal-Fetal Medicine Follow-Up    Dear Dr. Ramos    Thank you for requesting ultrasound evaluation and maternal fetal medicine consultation on your patient Jeaneth Connelly.  As you are aware she is a 39 year old female  with a morejon pregnancy and an Estimated Date of Delivery: 24.  She returned to maternal-fetal medicine today for a follow-up visit.  Her history was reviewed from her prior visit and there were no interval changes.    Antepartum Risk Factors  AMA --NIPT low risk, declines invasive testing   Prior  x 1  Mild polyhydramnios    PHYSICAL EXAMINATION:  /65 (BP Location: Right arm, Patient Position: Sitting, Cuff Size: adult)   Pulse 95   Ht 5' 7\" (1.702 m)   Wt 184 lb (83.5 kg)   BMI 28.82 kg/m²   General: alert and oriented, no acute distress  Abdomen: gravid, soft, non-tender  Extremities: non-tender, no edema    OBSTETRIC ULTRASOUND    Ultrasound Findings:  Single IUP in cephalic presentation.    Placenta is posterior.   A 3 vessel cord is noted.  Cardiac activity is present at 153 bpm    MVP is 6.9 cm . GRADY 24.3 cm  BPP is 8/8.       See PACS/Imaging Tab For Complete Ultrasound Report  I interpreted the results and reviewed them with the patient.    DISCUSSION  During her visit we discussed and reviewed the following issues:  ADVANCED MATERNAL AGE  See prior MFM notes for a detailed review.  She did not desire invasive genetic testing.   She has already obtained a low-risk NPIT result and was appropriately reassured.     POLYHYDRAMNIOS  RESOLVED- high-normal GRADY today    IMPRESSION:  IUP at 38w0d  AMA --NIPT low risk, declines invasive testing   Prior  x 1  Mild polyhydramnios - now high-normal     RECOMMENDATIONS:  Continue care with Dr. Ramos  Weekly NST's at 32 weeks.  Delivery at 39-40 weeks.    Thank you for allowing me to participate in the care of your patient.  Please do not hesitate to contact me if additional questions or concerns  arise.      Jeremiah Espinoza M.D.    20 minutes spent in review of records, patient consultation, documentation and coordination of care.  The relevant clinical matter(s) are summarized above.     Note to patient and family  The 21st Century Cures Act makes medical notes available to patients in the interest of transparency.  However, please be advised that this is a medical document.  It is intended as bqrs-my-oult communication.  It is written and medical language may contain abbreviations or verbiage that are technical and unfamiliar.  It may appear blunt or direct.  Medical documents are intended to carry relevant information, facts as evident, and the clinical opinion of the practitioner.

## 2024-09-06 ENCOUNTER — TELEPHONE (OUTPATIENT)
Dept: OBGYN UNIT | Facility: HOSPITAL | Age: 39
End: 2024-09-06

## 2024-09-09 ENCOUNTER — TELEPHONE (OUTPATIENT)
Dept: OBGYN UNIT | Facility: HOSPITAL | Age: 39
End: 2024-09-09

## 2024-09-13 ENCOUNTER — ULTRASOUND ENCOUNTER (OUTPATIENT)
Dept: PERINATAL CARE | Facility: HOSPITAL | Age: 39
End: 2024-09-13
Attending: OBSTETRICS & GYNECOLOGY
Payer: COMMERCIAL

## 2024-09-13 VITALS
WEIGHT: 184 LBS | DIASTOLIC BLOOD PRESSURE: 65 MMHG | HEART RATE: 95 BPM | BODY MASS INDEX: 28.88 KG/M2 | SYSTOLIC BLOOD PRESSURE: 121 MMHG | HEIGHT: 67 IN

## 2024-09-13 DIAGNOSIS — O40.3XX0 POLYHYDRAMNIOS IN THIRD TRIMESTER COMPLICATION, SINGLE OR UNSPECIFIED FETUS (HCC): ICD-10-CM

## 2024-09-13 DIAGNOSIS — O09.523 AMA (ADVANCED MATERNAL AGE) MULTIGRAVIDA 35+, THIRD TRIMESTER (HCC): ICD-10-CM

## 2024-09-13 DIAGNOSIS — O09.523 AMA (ADVANCED MATERNAL AGE) MULTIGRAVIDA 35+, THIRD TRIMESTER (HCC): Primary | ICD-10-CM

## 2024-09-13 DIAGNOSIS — Z67.91 RH NEGATIVE STATUS DURING PREGNANCY IN THIRD TRIMESTER (HCC): ICD-10-CM

## 2024-09-13 DIAGNOSIS — O26.893 RH NEGATIVE STATUS DURING PREGNANCY IN THIRD TRIMESTER (HCC): ICD-10-CM

## 2024-09-13 PROCEDURE — 76815 OB US LIMITED FETUS(S): CPT

## 2024-09-13 PROCEDURE — 76819 FETAL BIOPHYS PROFIL W/O NST: CPT | Performed by: OBSTETRICS & GYNECOLOGY

## 2024-09-20 ENCOUNTER — HOSPITAL ENCOUNTER (INPATIENT)
Facility: HOSPITAL | Age: 39
LOS: 3 days | Discharge: HOME | End: 2024-09-23
Attending: OBSTETRICS & GYNECOLOGY | Admitting: OBSTETRICS & GYNECOLOGY
Payer: COMMERCIAL

## 2024-09-20 ENCOUNTER — ANESTHESIA EVENT (OUTPATIENT)
Dept: OBGYN UNIT | Facility: HOSPITAL | Age: 39
End: 2024-09-20
Payer: COMMERCIAL

## 2024-09-20 ENCOUNTER — ANESTHESIA (OUTPATIENT)
Dept: OBGYN UNIT | Facility: HOSPITAL | Age: 39
End: 2024-09-20
Payer: COMMERCIAL

## 2024-09-20 DIAGNOSIS — Z48.89 ENCOUNTER FOR POSTOPERATIVE CARE: Primary | ICD-10-CM

## 2024-09-20 PROBLEM — Z34.90 PREGNANT (HCC): Status: ACTIVE | Noted: 2024-09-20

## 2024-09-20 PROBLEM — Z34.90 PREGNANCY (HCC): Status: ACTIVE | Noted: 2024-09-20

## 2024-09-20 LAB
ANTIBODY SCREEN: NEGATIVE
BASOPHILS # BLD AUTO: 0.01 X10(3) UL (ref 0–0.2)
BASOPHILS NFR BLD AUTO: 0.1 %
EOSINOPHIL # BLD AUTO: 0.08 X10(3) UL (ref 0–0.7)
EOSINOPHIL NFR BLD AUTO: 1.1 %
ERYTHROCYTE [DISTWIDTH] IN BLOOD BY AUTOMATED COUNT: 13.8 %
FETAL SCREEN RESULT: NEGATIVE
HCT VFR BLD AUTO: 35.2 %
HGB BLD-MCNC: 12.1 G/DL
IMM GRANULOCYTES # BLD AUTO: 0.02 X10(3) UL (ref 0–1)
IMM GRANULOCYTES NFR BLD: 0.3 %
LYMPHOCYTES # BLD AUTO: 1.02 X10(3) UL (ref 1–4)
LYMPHOCYTES NFR BLD AUTO: 14.1 %
MCH RBC QN AUTO: 31 PG (ref 26–34)
MCHC RBC AUTO-ENTMCNC: 34.4 G/DL (ref 31–37)
MCV RBC AUTO: 90.3 FL
MONOCYTES # BLD AUTO: 0.69 X10(3) UL (ref 0.1–1)
MONOCYTES NFR BLD AUTO: 9.5 %
NEUTROPHILS # BLD AUTO: 5.43 X10 (3) UL (ref 1.5–7.7)
NEUTROPHILS # BLD AUTO: 5.43 X10(3) UL (ref 1.5–7.7)
NEUTROPHILS NFR BLD AUTO: 74.9 %
PLATELET # BLD AUTO: 181 10(3)UL (ref 150–450)
RBC # BLD AUTO: 3.9 X10(6)UL
RH BLOOD TYPE: NEGATIVE
T PALLIDUM AB SER QL IA: NONREACTIVE
WBC # BLD AUTO: 7.3 X10(3) UL (ref 4–11)

## 2024-09-20 PROCEDURE — 3E0334Z INTRODUCTION OF SERUM, TOXOID AND VACCINE INTO PERIPHERAL VEIN, PERCUTANEOUS APPROACH: ICD-10-PCS | Performed by: OBSTETRICS & GYNECOLOGY

## 2024-09-20 PROCEDURE — 0UB70ZZ EXCISION OF BILATERAL FALLOPIAN TUBES, OPEN APPROACH: ICD-10-PCS | Performed by: OBSTETRICS & GYNECOLOGY

## 2024-09-20 PROCEDURE — 86780 TREPONEMA PALLIDUM: CPT | Performed by: OBSTETRICS & GYNECOLOGY

## 2024-09-20 PROCEDURE — 88302 TISSUE EXAM BY PATHOLOGIST: CPT | Performed by: OBSTETRICS & GYNECOLOGY

## 2024-09-20 PROCEDURE — 86900 BLOOD TYPING SEROLOGIC ABO: CPT | Performed by: OBSTETRICS & GYNECOLOGY

## 2024-09-20 PROCEDURE — 86901 BLOOD TYPING SEROLOGIC RH(D): CPT | Performed by: OBSTETRICS & GYNECOLOGY

## 2024-09-20 PROCEDURE — 85025 COMPLETE CBC W/AUTO DIFF WBC: CPT | Performed by: OBSTETRICS & GYNECOLOGY

## 2024-09-20 PROCEDURE — 85461 HEMOGLOBIN FETAL: CPT | Performed by: OBSTETRICS & GYNECOLOGY

## 2024-09-20 PROCEDURE — 86850 RBC ANTIBODY SCREEN: CPT | Performed by: OBSTETRICS & GYNECOLOGY

## 2024-09-20 RX ORDER — BISACODYL 10 MG
10 SUPPOSITORY, RECTAL RECTAL ONCE AS NEEDED
Status: DISCONTINUED | OUTPATIENT
Start: 2024-09-20 | End: 2024-09-23

## 2024-09-20 RX ORDER — SODIUM CHLORIDE, SODIUM LACTATE, POTASSIUM CHLORIDE, CALCIUM CHLORIDE 600; 310; 30; 20 MG/100ML; MG/100ML; MG/100ML; MG/100ML
125 INJECTION, SOLUTION INTRAVENOUS CONTINUOUS
Status: DISCONTINUED | OUTPATIENT
Start: 2024-09-20 | End: 2024-09-20 | Stop reason: HOSPADM

## 2024-09-20 RX ORDER — DOCUSATE SODIUM 100 MG/1
100 CAPSULE, LIQUID FILLED ORAL
Status: DISCONTINUED | OUTPATIENT
Start: 2024-09-20 | End: 2024-09-23

## 2024-09-20 RX ORDER — DEXTROSE, SODIUM CHLORIDE, SODIUM LACTATE, POTASSIUM CHLORIDE, AND CALCIUM CHLORIDE 5; .6; .31; .03; .02 G/100ML; G/100ML; G/100ML; G/100ML; G/100ML
INJECTION, SOLUTION INTRAVENOUS CONTINUOUS PRN
Status: DISCONTINUED | OUTPATIENT
Start: 2024-09-20 | End: 2024-09-23

## 2024-09-20 RX ORDER — METOCLOPRAMIDE HYDROCHLORIDE 5 MG/ML
10 INJECTION INTRAMUSCULAR; INTRAVENOUS EVERY 6 HOURS PRN
Status: DISCONTINUED | OUTPATIENT
Start: 2024-09-20 | End: 2024-09-23

## 2024-09-20 RX ORDER — ONDANSETRON 2 MG/ML
4 INJECTION INTRAMUSCULAR; INTRAVENOUS EVERY 6 HOURS PRN
Status: DISCONTINUED | OUTPATIENT
Start: 2024-09-20 | End: 2024-09-20 | Stop reason: HOSPADM

## 2024-09-20 RX ORDER — KETOROLAC TROMETHAMINE 30 MG/ML
30 INJECTION, SOLUTION INTRAMUSCULAR; INTRAVENOUS ONCE
Status: DISPENSED | OUTPATIENT
Start: 2024-09-20 | End: 2024-09-22

## 2024-09-20 RX ORDER — GABAPENTIN 300 MG/1
300 CAPSULE ORAL EVERY 8 HOURS PRN
Status: DISCONTINUED | OUTPATIENT
Start: 2024-09-20 | End: 2024-09-23

## 2024-09-20 RX ORDER — NALBUPHINE HYDROCHLORIDE 10 MG/ML
2.5 INJECTION, SOLUTION INTRAMUSCULAR; INTRAVENOUS; SUBCUTANEOUS EVERY 4 HOURS PRN
Status: DISCONTINUED | OUTPATIENT
Start: 2024-09-20 | End: 2024-09-23

## 2024-09-20 RX ORDER — DIPHENHYDRAMINE HCL 25 MG
25 CAPSULE ORAL EVERY 4 HOURS PRN
Status: DISCONTINUED | OUTPATIENT
Start: 2024-09-20 | End: 2024-09-23

## 2024-09-20 RX ORDER — HYDROMORPHONE HYDROCHLORIDE 1 MG/ML
0.4 INJECTION, SOLUTION INTRAMUSCULAR; INTRAVENOUS; SUBCUTANEOUS EVERY 5 MIN PRN
Status: ACTIVE | OUTPATIENT
Start: 2024-09-20 | End: 2024-09-21

## 2024-09-20 RX ORDER — DEXAMETHASONE SODIUM PHOSPHATE 4 MG/ML
VIAL (ML) INJECTION AS NEEDED
Status: DISCONTINUED | OUTPATIENT
Start: 2024-09-20 | End: 2024-09-20 | Stop reason: SURG

## 2024-09-20 RX ORDER — ACETAMINOPHEN 500 MG
1000 TABLET ORAL EVERY 6 HOURS
Status: DISCONTINUED | OUTPATIENT
Start: 2024-09-20 | End: 2024-09-23

## 2024-09-20 RX ORDER — MORPHINE SULFATE 2 MG/ML
INJECTION, SOLUTION INTRAMUSCULAR; INTRAVENOUS AS NEEDED
Status: DISCONTINUED | OUTPATIENT
Start: 2024-09-20 | End: 2024-09-20 | Stop reason: SURG

## 2024-09-20 RX ORDER — IBUPROFEN 600 MG/1
600 TABLET, FILM COATED ORAL EVERY 6 HOURS
Status: DISCONTINUED | OUTPATIENT
Start: 2024-09-21 | End: 2024-09-23

## 2024-09-20 RX ORDER — NALOXONE HYDROCHLORIDE 0.4 MG/ML
0.08 INJECTION, SOLUTION INTRAMUSCULAR; INTRAVENOUS; SUBCUTANEOUS
Status: ACTIVE | OUTPATIENT
Start: 2024-09-20 | End: 2024-09-21

## 2024-09-20 RX ORDER — EPHEDRINE SULFATE 50 MG/ML
INJECTION INTRAVENOUS AS NEEDED
Status: DISCONTINUED | OUTPATIENT
Start: 2024-09-20 | End: 2024-09-20 | Stop reason: SURG

## 2024-09-20 RX ORDER — AMMONIA INHALANTS 0.04 G/.3ML
0.3 INHALANT RESPIRATORY (INHALATION) AS NEEDED
Status: DISCONTINUED | OUTPATIENT
Start: 2024-09-20 | End: 2024-09-23

## 2024-09-20 RX ORDER — KETOROLAC TROMETHAMINE 30 MG/ML
30 INJECTION, SOLUTION INTRAMUSCULAR; INTRAVENOUS EVERY 6 HOURS
Status: COMPLETED | OUTPATIENT
Start: 2024-09-20 | End: 2024-09-21

## 2024-09-20 RX ORDER — HYDROMORPHONE HYDROCHLORIDE 1 MG/ML
0.2 INJECTION, SOLUTION INTRAMUSCULAR; INTRAVENOUS; SUBCUTANEOUS EVERY 5 MIN PRN
Status: ACTIVE | OUTPATIENT
Start: 2024-09-20 | End: 2024-09-21

## 2024-09-20 RX ORDER — MORPHINE SULFATE 0.5 MG/ML
0.1 INJECTION, SOLUTION EPIDURAL; INTRATHECAL; INTRAVENOUS ONCE
Status: DISCONTINUED | OUTPATIENT
Start: 2024-09-20 | End: 2024-09-20

## 2024-09-20 RX ORDER — ONDANSETRON 2 MG/ML
4 INJECTION INTRAMUSCULAR; INTRAVENOUS EVERY 6 HOURS PRN
Status: DISCONTINUED | OUTPATIENT
Start: 2024-09-20 | End: 2024-09-23

## 2024-09-20 RX ORDER — ACETAMINOPHEN 500 MG
1000 TABLET ORAL ONCE
Status: COMPLETED | OUTPATIENT
Start: 2024-09-20 | End: 2024-09-20

## 2024-09-20 RX ORDER — CITRIC ACID/SODIUM CITRATE 334-500MG
30 SOLUTION, ORAL ORAL ONCE
Status: DISCONTINUED | OUTPATIENT
Start: 2024-09-20 | End: 2024-09-20 | Stop reason: HOSPADM

## 2024-09-20 RX ORDER — ONDANSETRON 2 MG/ML
4 INJECTION INTRAMUSCULAR; INTRAVENOUS ONCE AS NEEDED
Status: ACTIVE | OUTPATIENT
Start: 2024-09-20 | End: 2024-09-20

## 2024-09-20 RX ORDER — BUPIVACAINE HYDROCHLORIDE 7.5 MG/ML
INJECTION, SOLUTION INTRASPINAL AS NEEDED
Status: DISCONTINUED | OUTPATIENT
Start: 2024-09-20 | End: 2024-09-20 | Stop reason: SURG

## 2024-09-20 RX ORDER — BENZONATATE 100 MG/1
100 CAPSULE ORAL 3 TIMES DAILY PRN
Status: DISCONTINUED | OUTPATIENT
Start: 2024-09-20 | End: 2024-09-23

## 2024-09-20 RX ORDER — NALBUPHINE HYDROCHLORIDE 10 MG/ML
2.5 INJECTION, SOLUTION INTRAMUSCULAR; INTRAVENOUS; SUBCUTANEOUS
Status: DISCONTINUED | OUTPATIENT
Start: 2024-09-20 | End: 2024-09-23

## 2024-09-20 RX ORDER — DIPHENHYDRAMINE HYDROCHLORIDE 50 MG/ML
12.5 INJECTION INTRAMUSCULAR; INTRAVENOUS EVERY 4 HOURS PRN
Status: DISCONTINUED | OUTPATIENT
Start: 2024-09-20 | End: 2024-09-23

## 2024-09-20 RX ORDER — SIMETHICONE 80 MG
80 TABLET,CHEWABLE ORAL 3 TIMES DAILY PRN
Status: DISCONTINUED | OUTPATIENT
Start: 2024-09-20 | End: 2024-09-23

## 2024-09-20 RX ORDER — SODIUM CHLORIDE, SODIUM LACTATE, POTASSIUM CHLORIDE, CALCIUM CHLORIDE 600; 310; 30; 20 MG/100ML; MG/100ML; MG/100ML; MG/100ML
INJECTION, SOLUTION INTRAVENOUS CONTINUOUS
Status: DISCONTINUED | OUTPATIENT
Start: 2024-09-20 | End: 2024-09-23

## 2024-09-20 RX ORDER — KETOROLAC TROMETHAMINE 30 MG/ML
INJECTION, SOLUTION INTRAMUSCULAR; INTRAVENOUS
Status: COMPLETED
Start: 2024-09-20 | End: 2024-09-20

## 2024-09-20 RX ORDER — METOCLOPRAMIDE HYDROCHLORIDE 5 MG/ML
INJECTION INTRAMUSCULAR; INTRAVENOUS AS NEEDED
Status: DISCONTINUED | OUTPATIENT
Start: 2024-09-20 | End: 2024-09-20 | Stop reason: SURG

## 2024-09-20 RX ADMIN — DEXAMETHASONE SODIUM PHOSPHATE 4 MG: 4 MG/ML VIAL (ML) INJECTION at 08:39:00

## 2024-09-20 RX ADMIN — SODIUM CHLORIDE, SODIUM LACTATE, POTASSIUM CHLORIDE, CALCIUM CHLORIDE: 600; 310; 30; 20 INJECTION, SOLUTION INTRAVENOUS at 09:28:00

## 2024-09-20 RX ADMIN — SODIUM CHLORIDE, SODIUM LACTATE, POTASSIUM CHLORIDE, CALCIUM CHLORIDE: 600; 310; 30; 20 INJECTION, SOLUTION INTRAVENOUS at 08:26:00

## 2024-09-20 RX ADMIN — EPHEDRINE SULFATE 10 MG: 50 INJECTION INTRAVENOUS at 08:45:00

## 2024-09-20 RX ADMIN — METOCLOPRAMIDE HYDROCHLORIDE 10 MG: 5 INJECTION INTRAMUSCULAR; INTRAVENOUS at 08:37:00

## 2024-09-20 RX ADMIN — SODIUM CHLORIDE, SODIUM LACTATE, POTASSIUM CHLORIDE, CALCIUM CHLORIDE: 600; 310; 30; 20 INJECTION, SOLUTION INTRAVENOUS at 08:49:00

## 2024-09-20 RX ADMIN — MORPHINE SULFATE 0.1 MG: 2 INJECTION, SOLUTION INTRAMUSCULAR; INTRAVENOUS at 08:33:00

## 2024-09-20 RX ADMIN — BUPIVACAINE HYDROCHLORIDE 1.6 ML: 7.5 INJECTION, SOLUTION INTRASPINAL at 08:33:00

## 2024-09-20 NOTE — OPERATIVE REPORT
PREOPERATIVE DIAGNOSIS:   1. 39 week pregnancy  2. Prior    3. Elective sterilization    POSTOPERATIVE DIAGNOSIS:   same    PROCEDURE PERFORMED: repeat low transverse  section. Bilateral salpingectomy.    SURGEON: Reji Raymond MD    ASSISTANT: DIANA Melchor MD    ANESTHESIA: Spinal    FINDINGS: A male infant was delivered from cephalic presentation. Amniotic fluid was clear. Uterus and bilateral adnexa were normal. There was cord entanglement around the neck and body each x 1.    PROCEDURE:   The involvement of the assisting physician was requested and provided aid in exposure/retraction, hemostasis, closure, and other intraoperative technical functions to help me as the primary surgeon carry out a safe operation with optimized results/outcome.  After informed consent, the patient was taken to the operating room, where anesthesia was dosed. The patient was placed supine in a left tilt. Cha catheter was in place in the bladder and the abdomen was prepped and draped.   Incision:   [place] Y     Pelosi N  Incision was created using a combination of sharp, cautery, and blunt dissection. Prior scar removed per request. Once abdomen was safely entered, a bladder blade was placed. Transverse lower segment incision was made with a scalpel and extended digitally. Amniotomy: already occurred: N   artificially performed with Allis: Y  The fetal head was brought through the uterine incision with manual guidance and extrinsic abdominal pressure. Vacuum used: N  (disengagements: n/a)  The fetal body delivered without difficulty.   The infant was bulb suctioned. Cord was doubly clamped after the appropriate delay then cut, and the infant was taken to the neonatologist.   Placenta was delivered: manually N    expressed Y  An Garry retractor was placed, and the uterus was internally inspected and cleared of debris. Uterine incision was closed with a continuous locking 0-Vicryl suture.    Second layer of  imbrication: N  After closure of the uterus, both fallopian tubes were removed in their entirety using a bipolar electrosurgical unit.  There was excellent hemostasis.  Hemostasis aided by: Isolated hemostatic sutures Y  electrosurgery Y  Gutters were cleared. We rechecked the uterine and bladder flap hemostasis and with minimal cautery found it to be good. Garry was removed. Subfascial area was inspected and found to be dry. 0 Vicryl was used to close the fascia in a continuous running fashion. Subcutaneous tissue was irrigated, dried, and found to be hemostatic.  2-0 plain gut used to reapproximate the subcutaneous fat: Y  Skin was closed with 4-0 Vicryl in a subcuticular fashion. Mastisol and Steri-Strips were applied.   All sponge, needle, and instrument counts were correct. Estimated blood loss was 750 ml. Infant and mother went to the recovery room in good condition.

## 2024-09-20 NOTE — ANESTHESIA PREPROCEDURE EVALUATION
PRE-OP EVALUATION    Patient Name: Jeaneth Connelly    Admit Diagnosis: pregnancy  Pregnant (HCC)  Pregnancy (HCC)    Pre-op Diagnosis: Intrauterine pregnancy 39+0 weeks, prior uterine surgery, desires sterilization     SECTION and Bilateral salpingectomy    Anesthesia Procedure:  SECTION and Bilateral salpingectomy (Abdomen)  BILATERAL POST-PARTUM TUBAL LIGATION (Abdomen)    Surgeons and Role:     * Reji Raymond MD - Primary     * Jennifer Melchor MD - Assisting Surgeon    Pre-op vitals reviewed.  Pulse: 106  BP: 122/72     Body mass index is 28.82 kg/m².    Current medications reviewed.  Hospital Medications:   [COMPLETED] lactated ringers IV bolus 1,000 mL  1,000 mL Intravenous Once    Followed by    lactated ringers infusion  125 mL/hr Intravenous Continuous    [COMPLETED] acetaminophen (Tylenol Extra Strength) tab 1,000 mg  1,000 mg Oral Once    ondansetron (Zofran) 4 MG/2ML injection 4 mg  4 mg Intravenous Q6H PRN    sodium citrate-citric acid (Bicitra) 500-334 MG/5ML oral solution 30 mL  30 mL Oral Once    oxyTOCIN in sodium chloride 0.9% (Pitocin) 30 Units/500mL infusion premix  62.5-900 josé luis-units/min Intravenous Continuous    ceFAZolin (Ancef) 2g in 10mL IV syringe premix  2 g Intravenous Once       Outpatient Medications:     Medications Prior to Admission   Medication Sig Dispense Refill Last Dose    ferrous sulfate 325 (65 FE) MG Oral Tab EC Take 1 tablet (325 mg total) by mouth every other day.   Past Week    Prenatal Vit w/Yv-Rcosefdzg-ZL (PNV OR) Take by mouth daily.   2024    Ergocalciferol (VITAMIN D OR) Take by mouth. (Patient not taking: Reported on 5/15/2024)       Clobetasol Propionate 0.05 % External Cream Apply to AA's of hands and feet BID x 2 weeks. Hold x 1 week. Restart PRN. (Patient not taking: Reported on 2021 ) 60 g 2        Allergies: Clindamycin, Azithromycin, and Dicloxacillin      Anesthesia Evaluation    Patient summary  reviewed.    Anesthetic Complications  (-) history of anesthetic complications         GI/Hepatic/Renal                                 Cardiovascular                                         (-) angina              Endo/Other                                  Pulmonary  Comment: + cough, - COVID                         Neuro/Psych                                      Past Surgical History:   Procedure Laterality Date      10/03/2018    Colposcopy,bx cervix/endocerv curr  2011     CIN1    [place] teeth removed  2016     Social History     Socioeconomic History    Marital status:    Occupational History    Occupation: national response specialist   Tobacco Use    Smoking status: Former     Current packs/day: 0.00     Types: Cigarettes     Start date: 2007     Quit date: 2009     Years since quitting: 15.7    Smokeless tobacco: Never   Vaping Use    Vaping status: Never Used   Substance and Sexual Activity    Alcohol use: No     Alcohol/week: 6.0 standard drinks of alcohol     Types: 6 Standard drinks or equivalent per week    Drug use: No    Sexual activity: Not Currently     Partners: Male   Other Topics Concern    Blood Transfusions No    Caffeine Concern No     Comment: 3 cups per day    Sleep Concern No    Stress Concern No    Weight Concern No    Exercise Yes    Self-Exams Yes     History   Drug Use No     Available pre-op labs reviewed.  Lab Results   Component Value Date    WBC 7.3 2024    RBC 3.90 2024    HGB 12.1 2024    HCT 35.2 2024    MCV 90.3 2024    MCH 31.0 2024    MCHC 34.4 2024    RDW 13.8 2024    .0 2024               Airway      Mallampati: II  Mouth opening: 3 FB  TM distance: 4 - 6 cm  Neck ROM: full Cardiovascular      Rhythm: regular  Rate: normal     Dental             Pulmonary      Breath sounds clear to auscultation bilaterally.               Other findings              ASA: 2   Plan: spinal  NPO  status verified and patient meets guidelines.    Post-procedure pain management plan discussed with surgeon and patient.    Comment: Benefits of spinal anesthesia over the general anesthesia for  section was discussed with Jeaneth Connelly and her . Realistic expectations of spinal anesthesia were also discussed including  temporary inability to feel and move lower extremities, possibly uncomfortable feel of \"pressure\" during certain parts of the procedure, possible sudden nausea/vomiting, pruritus, PPDH as well as other serious but rare complications including, allergic reaction to medications, infection, epidural hematoma, nerve/cord injury. Possibility of spinal anesthetic failure necessitating additional sedating/analgesic medications, and/or conversion to GA was also discussed. Patient verbalizes understanding of risks. All questions were answered. Agreed to proceed with procedure as planned under spinal anesthesia.      Plan/risks discussed with: patient and spouse                Present on Admission:  **None**

## 2024-09-20 NOTE — PLAN OF CARE
Problem: BIRTH - VAGINAL/ SECTION  Goal: Fetal and maternal status remain reassuring during the birth process  Description: INTERVENTIONS:  - Monitor vital signs  - Monitor fetal heart rate  - Monitor uterine activity  - Monitor labor progression (vaginal delivery)  - DVT prophylaxis (C/S delivery)  - Surgical antibiotic prophylaxis (C/S delivery)  Outcome: Completed     Problem: PAIN - ADULT  Goal: Verbalizes/displays adequate comfort level or patient's stated pain goal  Description: INTERVENTIONS:  - Encourage pt to monitor pain and request assistance  - Assess pain using appropriate pain scale  - Administer analgesics based on type and severity of pain and evaluate response  - Implement non-pharmacological measures as appropriate and evaluate response  - Consider cultural and social influences on pain and pain management  - Manage/alleviate anxiety  - Utilize distraction and/or relaxation techniques  - Monitor for opioid side effects  - Notify MD/LIP if interventions unsuccessful or patient reports new pain  - Anticipate increased pain with activity and pre-medicate as appropriate  Outcome: Completed     Problem: ANXIETY  Goal: Will report anxiety at manageable levels  Description: INTERVENTIONS:  - Administer medication as ordered  - Teach and rehearse alternative coping skills  - Provide emotional support with 1:1 interaction with staff  Outcome: Completed     Problem: Patient/Family Goals  Goal: Patient/Family Long Term Goal  Description: Patient's Long Term Goal: Safe and uncomplicated delivery    Interventions:  -  - See additional Care Plan goals for specific interventions  Outcome: Completed  Goal: Patient/Family Short Term Goal  Description: Patient's Short Term Goal: Adequate pain control    Interventions:     - See additional Care Plan goals for specific interventions  Outcome: Completed

## 2024-09-20 NOTE — PROGRESS NOTES
Pt is a 39 year old female admitted to [facility].     Chief Complaint   Patient presents with    Scheduled       Pt is  39w0d intra-uterine pregnancy.  History obtained, consents signed. Oriented to room, staff, and plan of care.

## 2024-09-20 NOTE — ANESTHESIA PROCEDURE NOTES
Spinal Block    Performed by: Jose E Garcia MD  Authorized by: Jose E Garcia MD      General Information and Staff    Start Time:   Anesthesiologist:  Jose E Garcia MD  Performed by:  Anesthesiologist  Patient Location:  OB  Site identification: surface landmarks    Preanesthetic Checklist: patient identified, IV checked, risks and benefits discussed, monitors and equipment checked, pre-op evaluation, timeout performed, anesthesia consent and sterile technique used      Procedure Details    Patient Position:  Sitting  Prep: ChloraPrep    Monitoring:  Cardiac monitor, heart rate and continuous pulse ox  Approach:  Midline  Location:  L4-5  Injection Technique:  Single-shot    Needle    Needle Type:  Sprotte  Needle Gauge:  24 G  Needle Length:  3.5 in    Assessment    Sensory Level:   Events: clear CSF, CSF aspirated, well tolerated and blood negative      Additional Comments

## 2024-09-20 NOTE — ANESTHESIA POSTPROCEDURE EVALUATION
[facility]    Jeaneth Connelly Patient Status:  Inpatient   Age/Gender 39 year old female MRN [id number]   Location [facility] LABOR & DELIVERY Attending Reji Raymond MD   Hosp Day # 0 PCP Amber Kumari MD       Anesthesia Post-op Note     SECTION and Bilateral salpingectomy    Procedure Summary       Date: 24 Room / Location:  L+D OR  /  L+D OR    Anesthesia Start: 827 Anesthesia Stop:     Procedures:        SECTION and Bilateral salpingectomy (Abdomen)      BILATERAL POST-PARTUM TUBAL LIGATION (Abdomen) Diagnosis: (Intrauterine pregnancy 39+0 weeks, prior uterine surgery, desires sterilization - delivered)    Surgeons: Reji Raymond MD Anesthesiologist: Jose E Garcia MD    Anesthesia Type: spinal ASA Status: 2            Anesthesia Type: spinal    Vitals Value Taken Time   /75 24 0935   Temp 98.6 degrees F 24 0936   Pulse 98 24 0935   Resp 16 24 0936   SpO2 100 % 24 0935   Vitals shown include unfiled device data.    Patient Location: Labor and Delivery    Anesthesia Type: spinal    Airway Patency: patent    Postop Pain Control: adequate    Mental Status: preanesthetic baseline    Nausea/Vomiting: none    Cardiopulmonary/Hydration status: stable euvolemic    Complications: no apparent anesthesia related complications    Postop vital signs: stable    Dental Exam: Unchanged from Preop    Patient to be discharged from PACU when criteria met.

## 2024-09-20 NOTE — PLAN OF CARE
Problem: BIRTH - VAGINAL/ SECTION  Goal: Fetal and maternal status remain reassuring during the birth process  Description: INTERVENTIONS:  - Monitor vital signs  - Monitor fetal heart rate  - Monitor uterine activity  - Monitor labor progression (vaginal delivery)  - DVT prophylaxis (C/S delivery)  - Surgical antibiotic prophylaxis (C/S delivery)  Outcome: Progressing     Problem: PAIN - ADULT  Goal: Verbalizes/displays adequate comfort level or patient's stated pain goal  Description: INTERVENTIONS:  - Encourage pt to monitor pain and request assistance  - Assess pain using appropriate pain scale  - Administer analgesics based on type and severity of pain and evaluate response  - Implement non-pharmacological measures as appropriate and evaluate response  - Consider cultural and social influences on pain and pain management  - Manage/alleviate anxiety  - Utilize distraction and/or relaxation techniques  - Monitor for opioid side effects  - Notify MD/LIP if interventions unsuccessful or patient reports new pain  - Anticipate increased pain with activity and pre-medicate as appropriate  Outcome: Progressing     Problem: ANXIETY  Goal: Will report anxiety at manageable levels  Description: INTERVENTIONS:  - Administer medication as ordered  - Teach and rehearse alternative coping skills  - Provide emotional support with 1:1 interaction with staff  Outcome: Progressing     Problem: Patient/Family Goals  Goal: Patient/Family Long Term Goal  Description: Patient's Long Term Goal: Safe and uncomplicated delivery    Interventions:  -  - See additional Care Plan goals for specific interventions  Outcome: Progressing  Goal: Patient/Family Short Term Goal  Description: Patient's Short Term Goal: Adequate pain control    Interventions:     - See additional Care Plan goals for specific interventions  Outcome: Progressing

## 2024-09-20 NOTE — H&P
[facility]  Labor and Delivery Prenatal History and Physical Interval Addendum  Please see full Prenatal Record for this pregnancy      SUBJECTIVE:    Interval History:     This is a pregnancy at 39 weeks admitted for repeat  delivery.  Pregnancy notable for: one prior C/S.    OBJECTIVE:    The patient is comfortable    Fetal Surveillance:  see NST in holding    Recent Labs   Lab 24  0729   RBC 3.90   HGB 12.1   HCT 35.2   MCV 90.3   MCH 31.0   MCHC 34.4   RDW 13.8   NEPRELIM 5.43   WBC 7.3   .0          ASSESSMENT/PLAN:    39 wk pregnancy, prior    For RCS and elective sterilization via bilateral salpingectomy  Gbs status: neg    Problems pertinent to admission:  As above

## 2024-09-20 NOTE — PROGRESS NOTES
Patient transferred to mother/baby room 2213 per cart in stable condition with baby and personal belongings.  Accompanied by  and staff.  Report given to mother/baby DENIA Ingram.

## 2024-09-21 LAB
BASOPHILS # BLD AUTO: 0.01 X10(3) UL (ref 0–0.2)
BASOPHILS NFR BLD AUTO: 0.1 %
EOSINOPHIL # BLD AUTO: 0.07 X10(3) UL (ref 0–0.7)
EOSINOPHIL NFR BLD AUTO: 1 %
ERYTHROCYTE [DISTWIDTH] IN BLOOD BY AUTOMATED COUNT: 14 %
HCT VFR BLD AUTO: 33.3 %
HGB BLD-MCNC: 11.3 G/DL
IMM GRANULOCYTES # BLD AUTO: 0.01 X10(3) UL (ref 0–1)
IMM GRANULOCYTES NFR BLD: 0.1 %
LYMPHOCYTES # BLD AUTO: 1.64 X10(3) UL (ref 1–4)
LYMPHOCYTES NFR BLD AUTO: 23.4 %
MCH RBC QN AUTO: 31.5 PG (ref 26–34)
MCHC RBC AUTO-ENTMCNC: 33.9 G/DL (ref 31–37)
MCV RBC AUTO: 92.8 FL
MONOCYTES # BLD AUTO: 0.44 X10(3) UL (ref 0.1–1)
MONOCYTES NFR BLD AUTO: 6.3 %
NEUTROPHILS # BLD AUTO: 4.83 X10 (3) UL (ref 1.5–7.7)
NEUTROPHILS # BLD AUTO: 4.83 X10(3) UL (ref 1.5–7.7)
NEUTROPHILS NFR BLD AUTO: 69.1 %
PLATELET # BLD AUTO: 161 10(3)UL (ref 150–450)
RBC # BLD AUTO: 3.59 X10(6)UL
WBC # BLD AUTO: 7 X10(3) UL (ref 4–11)

## 2024-09-21 PROCEDURE — 85025 COMPLETE CBC W/AUTO DIFF WBC: CPT | Performed by: OBSTETRICS & GYNECOLOGY

## 2024-09-21 NOTE — PROGRESS NOTES
Postop Day 1    Pt without complaints.     Temp:  [97.7 °F (36.5 °C)-98.6 °F (37 °C)] 97.9 °F (36.6 °C)  Pulse:  [] 81  Resp:  [13-22] 16  BP: (103-133)/(64-92) 124/81  SpO2:  [94 %-100 %] 99 %    Intake/Output Summary (Last 24 hours) at 9/21/2024 0809  Last data filed at 9/21/2024 0158  Gross per 24 hour   Intake 2238 ml   Output 3765 ml   Net -1527 ml     abd  soft, NT, ND, fundus firm below umbilicus, incision C/D/I  extr  trace edema, no calf tenderness    Recent Labs   Lab 09/20/24  0729   RBC 3.90   HGB 12.1   HCT 35.2   MCV 90.3   MCH 31.0   MCHC 34.4   RDW 13.8   NEPRELIM 5.43   WBC 7.3   .0     Impression: POD#1  Plan:  Ambulation encouraged.    Advance diet as tolerated.    Continue postpartum care.    Circumcision declined.

## 2024-09-21 NOTE — PROGRESS NOTES
[facility]    Jeaneth Connelly Patient Status:  Inpatient   Age/Gender 39 year old female MRN [id number]   Location [facility]SW[facility]J Attending Reji Raymond MD   Hosp Day # 1 PCP Amber Kumari MD      Anesthesia Pain Progress Note    Anesthesia Technique:   Spinal Anesthesia     Pain Management Technique:  In addition to available oral supplemental and IV medications  Patient received neuraxial preservative free morphine for post procedural pain control.    Post Procedure Pain Quality:    Adequate    Pain Management Side Effects:  None     /81 (BP Location: Right arm)   Pulse 81   Temp 97.9 °F (36.6 °C) (Oral)   Resp 16   Wt 83.5 kg (184 lb)   SpO2 99%   Breastfeeding Yes   BMI 28.82 kg/m²       Injection Site: Injection site is intact on inspection     Complications from Pain Management or Anesthesia:   None    All patient questions were answered.  Follow up pain management is separate from intraoperative anesthetic needs.  Pain care is transitioned to primary service, with management by oral medications.    Thank you for asking us to participate in the care of your patient.    Ember Christianson MD, 24, 1:22 PM      Ember Christianson MD, 24, 1:22 PM

## 2024-09-21 NOTE — PLAN OF CARE
Problem: POSTPARTUM  Goal: Long Term Goal:Experiences normal postpartum course  Description: INTERVENTIONS:  - Assess and monitor vital signs and lab values.  - Assess fundus and lochia.  - Provide ice/sitz baths for perineum discomfort.  - Monitor healing of incision/episiotomy/laceration, and assess for signs and symptoms of infection and hematoma.  - Assess bladder function and monitor for bladder distention.  - Provide/instruct/assist with pericare as needed.  - Provide VTE prophylaxis as needed.  - Monitor bowel function.  - Encourage ambulation and provide assistance as needed.  - Assess and monitor emotional status and provide social service/psych resources as needed.  - Utilize standard precautions and use personal protective equipment as indicated. Ensure aseptic care of all intravenous lines and invasive tubes/drains.  - Obtain immunization and exposure to communicable diseases history.  9/21/2024 1839 by Qiana Stallings RN  Outcome: Progressing  9/21/2024 0848 by Qiana Stallings RN  Outcome: Progressing  Goal: Optimize infant feeding at the breast  Description: INTERVENTIONS:  - Initiate breast feeding within first hour after birth.   - Monitor effectiveness of current breast feeding efforts.  - Assess support systems available to mother/family.  - Identify cultural beliefs/practices regarding lactation, letdown techniques, maternal food preferences.  - Assess mother's knowledge and previous experience with breast feeding.  - Provide information as needed about early infant feeding cues (e.g., rooting, lip smacking, sucking fingers/hand) versus late cue of crying.  - Discuss/demonstrate breast feeding aids (e.g., infant sling, nursing footstool/pillows, and breast pumps).  - Encourage mother/other family members to express feelings/concerns, and actively listen.  - Educate father/SO about benefits of breast feeding and how to manage common lactation challenges.  - Recommend avoidance  of specific medications or substances incompatible with breast feeding.  - Assess and monitor for signs of nipple pain/trauma.  - Instruct and provide assistance with proper latch.  - Review techniques for milk expression (breast pumping) and storage of breast milk. Provide pumping equipment/supplies, instructions and assistance, as needed.  - Encourage rooming-in and breast feeding on demand.  - Encourage skin-to-skin contact.  - Provide LC support as needed.  - Assess for and manage engorgement.  - Provide breast feeding education handouts and information on community breast feeding support.   2024 by Qiana Stallings RN  Outcome: Progressing  2024 by Qiana Stallings RN  Outcome: Progressing  Goal: Establishment of adequate milk supply with medication/procedure interruptions  Description: INTERVENTIONS:  - Review techniques for milk expression (breast pumping).   - Provide pumping equipment/supplies, instructions, and assistance until it is safe to breastfeed infant.  2024 by Qiana Stallings RN  Outcome: Progressing  2024 by Qiana Stallings RN  Outcome: Progressing  Goal: Appropriate maternal -  bonding  Description: INTERVENTIONS:  - Assess caregiver- interactions.  - Assess caregiver's emotional status and coping mechanisms.  - Encourage caregiver to participate in  daily care.  - Assess support systems available to mother/family.  - Provide /case management support as needed.  2024 by Qiana Stallings RN  Outcome: Progressing  2024 by Qiana Stallings RN  Outcome: Progressing

## 2024-09-22 RX ORDER — GABAPENTIN 300 MG/1
300 CAPSULE ORAL EVERY 8 HOURS PRN
Qty: 10 CAPSULE | Refills: 0 | Status: SHIPPED | OUTPATIENT
Start: 2024-09-22

## 2024-09-22 RX ORDER — OXYCODONE HYDROCHLORIDE 5 MG/1
5 TABLET ORAL EVERY 4 HOURS PRN
Qty: 5 TABLET | Refills: 0 | Status: SHIPPED | OUTPATIENT
Start: 2024-09-22

## 2024-09-22 NOTE — PROGRESS NOTES
Postop Day 2    Pt without complaints.     Temp:  [97.9 °F (36.6 °C)-98.7 °F (37.1 °C)] 98.7 °F (37.1 °C)  Pulse:  [81-86] 86  Resp:  [16] 16  BP: (124-132)/(81-85) 132/85  abd  soft, NT, ND, fundus firm below umbilicus, incision C/D/I  extr  trace edema, no calf tenderness  Recent Labs   Lab 09/20/24  0729 09/21/24  0758   RBC 3.90 3.59*   HGB 12.1 11.3*   HCT 35.2 33.3*   MCV 90.3 92.8   MCH 31.0 31.5   MCHC 34.4 33.9   RDW 13.8 14.0   NEPRELIM 5.43 4.83   WBC 7.3 7.0   .0 161.0     Impression: POD#2  Plan:  Continue postop care.    Anticipate discharge home tomorrow.

## 2024-09-22 NOTE — PLAN OF CARE
Problem: POSTPARTUM  Goal: Long Term Goal:Experiences normal postpartum course  Description: INTERVENTIONS:  - Assess and monitor vital signs and lab values.  - Assess fundus and lochia.  - Provide ice/sitz baths for perineum discomfort.  - Monitor healing of incision/episiotomy/laceration, and assess for signs and symptoms of infection and hematoma.  - Assess bladder function and monitor for bladder distention.  - Provide/instruct/assist with pericare as needed.  - Provide VTE prophylaxis as needed.  - Monitor bowel function.  - Encourage ambulation and provide assistance as needed.  - Assess and monitor emotional status and provide social service/psych resources as needed.  - Utilize standard precautions and use personal protective equipment as indicated. Ensure aseptic care of all intravenous lines and invasive tubes/drains.  - Obtain immunization and exposure to communicable diseases history.  9/22/2024 0758 by Qiana Stallings RN  Outcome: Progressing  9/21/2024 1839 by Qiana Stallings RN  Outcome: Progressing  Goal: Optimize infant feeding at the breast  Description: INTERVENTIONS:  - Initiate breast feeding within first hour after birth.   - Monitor effectiveness of current breast feeding efforts.  - Assess support systems available to mother/family.  - Identify cultural beliefs/practices regarding lactation, letdown techniques, maternal food preferences.  - Assess mother's knowledge and previous experience with breast feeding.  - Provide information as needed about early infant feeding cues (e.g., rooting, lip smacking, sucking fingers/hand) versus late cue of crying.  - Discuss/demonstrate breast feeding aids (e.g., infant sling, nursing footstool/pillows, and breast pumps).  - Encourage mother/other family members to express feelings/concerns, and actively listen.  - Educate father/SO about benefits of breast feeding and how to manage common lactation challenges.  - Recommend avoidance  of specific medications or substances incompatible with breast feeding.  - Assess and monitor for signs of nipple pain/trauma.  - Instruct and provide assistance with proper latch.  - Review techniques for milk expression (breast pumping) and storage of breast milk. Provide pumping equipment/supplies, instructions and assistance, as needed.  - Encourage rooming-in and breast feeding on demand.  - Encourage skin-to-skin contact.  - Provide LC support as needed.  - Assess for and manage engorgement.  - Provide breast feeding education handouts and information on community breast feeding support.   2024 by Qiana Stallings RN  Outcome: Progressing  2024 by Qiana Stallings RN  Outcome: Progressing  Goal: Establishment of adequate milk supply with medication/procedure interruptions  Description: INTERVENTIONS:  - Review techniques for milk expression (breast pumping).   - Provide pumping equipment/supplies, instructions, and assistance until it is safe to breastfeed infant.  2024 by Qiana Stallings RN  Outcome: Progressing  2024 by Qiana Stallings RN  Outcome: Progressing  Goal: Appropriate maternal -  bonding  Description: INTERVENTIONS:  - Assess caregiver- interactions.  - Assess caregiver's emotional status and coping mechanisms.  - Encourage caregiver to participate in  daily care.  - Assess support systems available to mother/family.  - Provide /case management support as needed.  2024 by Qiana Stallings RN  Outcome: Progressing  2024 by Qiana Stallings RN  Outcome: Progressing

## 2024-09-22 NOTE — PLAN OF CARE
Problem: POSTPARTUM  Goal: Long Term Goal:Experiences normal postpartum course  Description: INTERVENTIONS:  - Assess and monitor vital signs and lab values.  - Assess fundus and lochia.  - Provide ice/sitz baths for perineum discomfort.  - Monitor healing of incision/episiotomy/laceration, and assess for signs and symptoms of infection and hematoma.  - Assess bladder function and monitor for bladder distention.  - Provide/instruct/assist with pericare as needed.  - Provide VTE prophylaxis as needed.  - Monitor bowel function.  - Encourage ambulation and provide assistance as needed.  - Assess and monitor emotional status and provide social service/psych resources as needed.  - Utilize standard precautions and use personal protective equipment as indicated. Ensure aseptic care of all intravenous lines and invasive tubes/drains.  - Obtain immunization and exposure to communicable diseases history.  9/22/2024 1827 by Qiana Stallings RN  Outcome: Progressing  9/22/2024 0758 by Qiana Stallings RN  Outcome: Progressing  Goal: Optimize infant feeding at the breast  Description: INTERVENTIONS:  - Initiate breast feeding within first hour after birth.   - Monitor effectiveness of current breast feeding efforts.  - Assess support systems available to mother/family.  - Identify cultural beliefs/practices regarding lactation, letdown techniques, maternal food preferences.  - Assess mother's knowledge and previous experience with breast feeding.  - Provide information as needed about early infant feeding cues (e.g., rooting, lip smacking, sucking fingers/hand) versus late cue of crying.  - Discuss/demonstrate breast feeding aids (e.g., infant sling, nursing footstool/pillows, and breast pumps).  - Encourage mother/other family members to express feelings/concerns, and actively listen.  - Educate father/SO about benefits of breast feeding and how to manage common lactation challenges.  - Recommend avoidance  of specific medications or substances incompatible with breast feeding.  - Assess and monitor for signs of nipple pain/trauma.  - Instruct and provide assistance with proper latch.  - Review techniques for milk expression (breast pumping) and storage of breast milk. Provide pumping equipment/supplies, instructions and assistance, as needed.  - Encourage rooming-in and breast feeding on demand.  - Encourage skin-to-skin contact.  - Provide LC support as needed.  - Assess for and manage engorgement.  - Provide breast feeding education handouts and information on community breast feeding support.   2024 by Qiana Stallings RN  Outcome: Progressing  2024 by Qiana Stallings RN  Outcome: Progressing  Goal: Establishment of adequate milk supply with medication/procedure interruptions  Description: INTERVENTIONS:  - Review techniques for milk expression (breast pumping).   - Provide pumping equipment/supplies, instructions, and assistance until it is safe to breastfeed infant.  2024 by Qiana Stallings RN  Outcome: Progressing  2024 by Qiana Stallings RN  Outcome: Progressing  Goal: Appropriate maternal -  bonding  Description: INTERVENTIONS:  - Assess caregiver- interactions.  - Assess caregiver's emotional status and coping mechanisms.  - Encourage caregiver to participate in  daily care.  - Assess support systems available to mother/family.  - Provide /case management support as needed.  2024 by Qiana Stallings RN  Outcome: Progressing  2024 by Qiana Stallings RN  Outcome: Progressing

## 2024-09-23 VITALS
DIASTOLIC BLOOD PRESSURE: 69 MMHG | BODY MASS INDEX: 29 KG/M2 | TEMPERATURE: 99 F | OXYGEN SATURATION: 99 % | RESPIRATION RATE: 16 BRPM | SYSTOLIC BLOOD PRESSURE: 116 MMHG | WEIGHT: 184 LBS | HEART RATE: 74 BPM

## 2024-09-23 NOTE — DISCHARGE INSTRUCTIONS
Post  Section Home Care Instructions     We hope you were pleased with your care at [facility].  We wish you the best outcome and overall experience with the delivery of your baby.  These instructions will help to minimize pain, limit the risk for an infection, and improve the likelihood of a successful recovery.    What to Expect:  Abdominal cramping after delivery especially if you are breastfeeding.   Vaginal bleeding for about 4-6 weeks that may be followed by a yellow or white discharge for a few more weeks.  Your period will resume in approximately 6-8 weeks, unless you are breastfeeding.    If you are bottle feeding, you may notice breast engorgement in about 3 days.  Your breast may be sore and hard. Please wear a tight fitted bra or sports bra for 24-36 hours to help prevent your breast from producing milk, and use ice packs to relive any discomfort.  If you are breastfeeding, nipple dryness is very common the first few days.    Constipation is common after having a baby.  Please increase fluid and fiber in your diet.      Over-The-Counter Medication  Non-prescription anti-inflammatory medications can also help to ease the pain.  You may take Aleve, Tylenol or Ibuprofen   Colace or Metamucil for Constipation  Lanolin for dry nipples  Tucks, Witch Hazel and Epifoam for Episiotomy discomfort.   Drink a full glass of water with oral medication and take as directed.    Wound Care  The following instructions will promote proper healing and help to prevent infection  Leave your Steri-Strips (butterfly tapes) in place as long as possible.  Do not remove the Steri-Strips  Do not replace the Steri-Strips, if they come off.  If the tapes come off, leave them off and keep the incision clean.  You do not need to cover the incision or put any medications on the incision.    Bathing/Showers  You may resume showers  No baths, swimming, hot tubs until your post-partum visit    Home Medication  Resume your  home medications as instructed    Diet  Resume your normal diet    Activity  Refrain from vaginal intercourse, vaginal suppositories, tampon use or douches until after your post-partum visit  No exercising for 4 weeks  You may climb stairs minimally for the 1st week.    Do not do heavy housework for at least 2-3 weeks    Return to Work or School  You may return to work in 6-8 weeks  Contact your obstetrician’s office, if you need a medical release.   Driving  Avoid driving for 1-2 weeks or sooner if not taking narcotics.    Follow-up Appointment with Your Obstetrician  Call your obstetrician’s office today for an appointment in:        weeks.   Verify your appointment date, day, time, and location.  At your 1st post-partum office visit:  Your progress will be evaluated, findings reviewed, and any additional concerns and instructions will be discussed.    Questions or Concerns  Call your obstetrician’s office if you experience the following:  Severe pain not controlled by pain medication  Too much pain when touched; yellowish, greenish, or bloody discharge, foul smelling vaginal discharge, cut site opens up  Heavy bleeding,problems with pain that does not go away or gets worse  Shortness of breath or sudden onset of chest pain  Fever of 100.4 F (38 C) or higher, chills, warmth around wound that will not heal  Redness, increased swelling or drainage from your incision  Crying and periods of sadness that prevents you from caring for yourself and your baby or you feel like harming yourself or baby.  Burning sensation during urination or inability to urinate or have bowel movements  Swelling, redness or abnormal warmth to your leg/calf  Swollen, hard, or painful breasts  You are not feeling better in 2 to 3 days, or are feeling increasingly worse  If your call is made after office hours, a physician will be available to help you.  There is always a provider covering our patients.                      MEDICATIONS  offered/used during your stay:    @ MOTRIN (Ibuprofin 600mg)   1 tab every 6 hours as needed for pain   Last taken at:   --> Next dose due at:       @ TYLENOL (Acetaminophen 500)   1-2 tabs, every 6 hours, as needed for increased pain.  Last taken at:   --> Next dose due at:    @ GABAPENTIN (Neurontin 300mg)  1 tab, every 8 hours as needed for pain  Last taken at:    --> Next dose due at:      @ COLACE (Docusate Sodium 100mg)  1 tab two times per day as needed for stool softening   (once in am/once in pm)  Last taken at:   --> Next dose due at:      @ SIMETHICONE (Mylicon 80mg) Chewable Tab   1 tab daily as needed for gas.  Last taken at:    @ DERMOPLAST (Pain Relieving Spray)   Use as needed for perineal discomfort    @ TUCKS (Witch-Cristina Glycerin pads)   Use as needed for hemorrhoids and/or perineal discomfort    Please see your Parent-Infant instruction pamphlet in your white [place] folder for further reference/review/instructions.  Thank you for coming to [facility]. We hope you've enjoyed your experience here.

## 2024-09-23 NOTE — DISCHARGE SUMMARY
[facility]  Discharge Summary    Jeaneth Connelly Patient Status:  Inpatient    1985 MRN [id number]   Location [facility] 2SW-J Attending Reji Raymond MD   Hosp Day # 3 PCP Amber Kumari MD     Date of Admission: 2024    Date of Discharge: 24    Admitting Diagnosis: pregnancy  Pregnant (HCC)  Pregnancy (HCC)    Discharge Diagnosis:   Patient Active Problem List   Diagnosis    Bunion of great toe of left foot    Hyperhidrosis    Rh negative status during pregnancy (HCC)    Anxiety    Pregnant (HCC)    Pregnancy (HCC)       Reason for Admission: scheduled repeat c/s            Hospital Course: routine post op course        Procedures: LTCS and bilateral salpingectomy    Complications: none    Disposition: Home or Self Care    Discharge Condition: Stable    Discharge Medications:   Current Discharge Medication List        START taking these medications    Details   gabapentin 300 MG Oral Cap Take 1 capsule (300 mg total) by mouth every 8 (eight) hours as needed (For breakthrough moderate pain).  Qty: 10 capsule, Refills: 0      oxyCODONE 5 MG Oral Tab Take 1 tablet (5 mg total) by mouth every 4 (four) hours as needed for Pain.  Qty: 5 tablet, Refills: 0    Associated Diagnoses: Encounter for postoperative care      Naloxone HCl 4 MG/0.1ML Nasal Liquid 4 mg by Nasal route as needed. If patient remains unresponsive, repeat dose in other nostril 2-5 minutes after first dose.  Qty: 1 kit, Refills: 0           CONTINUE these medications which have NOT CHANGED    Details   Prenatal Vit w/Rj-Svknwykvq-BG (PNV OR) Take by mouth daily.           STOP taking these medications       ferrous sulfate 325 (65 FE) MG Oral Tab EC        Ergocalciferol (VITAMIN D OR)        Clobetasol Propionate 0.05 % External Cream                          Karla Bah MD  2024  9:52 AM

## 2024-09-23 NOTE — PROGRESS NOTES
DISCHARGE NOTE  Discharge and follow-up appointment information reviewed with parents, no questions following.  ID Bands checked and verified at bedside. HUGS tag removed. Baby in: car seat   Parent in wheelchair.   Escorted off unit by: PCT

## 2024-09-23 NOTE — PROGRESS NOTES
Postop Day 2    Pt without complaints.     Temp: 98.7 °F (37.1 °C)  Pulse: 74  Resp: 16  BP: 116/69  abd  soft, NT, ND, fundus firm below umbilicus, incision C/D/I  extr  trace edema, no calf tenderness    Impression: POD#2  Plan:  Continue postop care.    Anticipate discharge home tomorrow.

## 2024-09-23 NOTE — PLAN OF CARE
Problem: POSTPARTUM  Goal: Long Term Goal:Experiences normal postpartum course  Description: INTERVENTIONS:  - Assess and monitor vital signs and lab values.  - Assess fundus and lochia.  - Provide ice/sitz baths for perineum discomfort.  - Monitor healing of incision/episiotomy/laceration, and assess for signs and symptoms of infection and hematoma.  - Assess bladder function and monitor for bladder distention.  - Provide/instruct/assist with pericare as needed.  - Provide VTE prophylaxis as needed.  - Monitor bowel function.  - Encourage ambulation and provide assistance as needed.  - Assess and monitor emotional status and provide social service/psych resources as needed.  - Utilize standard precautions and use personal protective equipment as indicated. Ensure aseptic care of all intravenous lines and invasive tubes/drains.  - Obtain immunization and exposure to communicable diseases history.  9/23/2024 0753 by Qiana Stallings RN  Outcome: Completed  9/22/2024 1827 by Qiana Stallings RN  Outcome: Progressing  Goal: Optimize infant feeding at the breast  Description: INTERVENTIONS:  - Initiate breast feeding within first hour after birth.   - Monitor effectiveness of current breast feeding efforts.  - Assess support systems available to mother/family.  - Identify cultural beliefs/practices regarding lactation, letdown techniques, maternal food preferences.  - Assess mother's knowledge and previous experience with breast feeding.  - Provide information as needed about early infant feeding cues (e.g., rooting, lip smacking, sucking fingers/hand) versus late cue of crying.  - Discuss/demonstrate breast feeding aids (e.g., infant sling, nursing footstool/pillows, and breast pumps).  - Encourage mother/other family members to express feelings/concerns, and actively listen.  - Educate father/SO about benefits of breast feeding and how to manage common lactation challenges.  - Recommend avoidance of  specific medications or substances incompatible with breast feeding.  - Assess and monitor for signs of nipple pain/trauma.  - Instruct and provide assistance with proper latch.  - Review techniques for milk expression (breast pumping) and storage of breast milk. Provide pumping equipment/supplies, instructions and assistance, as needed.  - Encourage rooming-in and breast feeding on demand.  - Encourage skin-to-skin contact.  - Provide LC support as needed.  - Assess for and manage engorgement.  - Provide breast feeding education handouts and information on community breast feeding support.   2024 by Qiana Stallings RN  Outcome: Completed  2024 by Qiana Stallings RN  Outcome: Progressing  Goal: Establishment of adequate milk supply with medication/procedure interruptions  Description: INTERVENTIONS:  - Review techniques for milk expression (breast pumping).   - Provide pumping equipment/supplies, instructions, and assistance until it is safe to breastfeed infant.  2024 by Qiana Stallings RN  Outcome: Completed  2024 by Qiana Stallings RN  Outcome: Progressing  Goal: Appropriate maternal -  bonding  Description: INTERVENTIONS:  - Assess caregiver- interactions.  - Assess caregiver's emotional status and coping mechanisms.  - Encourage caregiver to participate in  daily care.  - Assess support systems available to mother/family.  - Provide /case management support as needed.  2024 by Qiana Stallings RN  Outcome: Completed  2024 by Qiana Stallings RN  Outcome: Progressing

## 2024-09-25 ENCOUNTER — TELEPHONE (OUTPATIENT)
Dept: OBGYN UNIT | Facility: HOSPITAL | Age: 39
End: 2024-09-25

## 2024-09-25 NOTE — PROGRESS NOTES
09/25/24 1054   Cradle Call Follow up   Cradle call follow up date 09/25/24   Follow up needed Completed   Hypertension or Preeclampsia during pregnancy or delivery No     Outgoing Cradle Call completed:    Mom reports that she and infant are doing well.  Baby has had a pediatrician F/U visit.   Reminded pt to schedule a postpartum follow up visit.   No complaints of PPD.   Reviewed basic self and infant care.   Encouraged to follow up w/ MD's w/ further question/concerns.

## (undated) DEVICE — LIGASURE EXACT DISSECTOR: Brand: LIGASURE

## (undated) DEVICE — LARGE, DISPOSABLE ALEXIS O C-SECTION PROTECTOR - RETRACTOR: Brand: ALEXIS ® O C-SECTION PROTECTOR - RETRACTOR

## (undated) NOTE — LETTER
2024      Ervin Ramos MD  [address]  [address]  [place] [facility] [address]  Via Outside Provider Messaging  Patient: Jeaneth Connelly  : 1985    Dear Colleague:  Thank you for referring your patient to me for a Maternal Fetal Medicine evaluation. Please see my attached note for my findings and recommendations.      Should you have any questions or concerns, please do not hesitate to contact me at the number listed below.    Best Regards,      Jill Aden MD  [facility]   [address]  [facility] [address] [phone]    cc: No Recipients      [facility] Department of Maternal Fetal Medicine  Patient Name: Jeaneth Connelly  Patient : 1985  Physician: Jill Aden MD    Pt here for Growth Ultrasound/BPP  +fm noted per patient  Pt noted N&V w/onset this morning (brushing teeth).  Pt had nausea 1st trimester    Outpatient Maternal-Fetal Medicine Consultation    Dear Dr. Ramos    Thank you for requesting ultrasound evaluation and maternal fetal medicine consultation on your patient Jeaneth Connelly.  As you are aware she is a 39 year old female  with a morejon pregnancy and an Estimated Date of Delivery: 24.  A maternal-fetal medicine  f/u is today.  Her prenatal records and labs were reviewed.    Feeling well.  Passed glucose screen.  ROS    HISTORY  OB History    Para Term  AB Living   2 1 1 0 0 1   SAB IAB Ectopic Multiple Live Births   0 0 0 0 1      # Outcome Date GA Lbr Huy/2nd Weight Sex Type Anes PTL Lv   2 Current            1 Term 10/03/18 40w6d  9 lb 2 oz (4.14 kg) M CS-LTranv Spinal N PRASANNA      Complications: Fetal intolerance to labor       Allergies:  Allergies   Allergen Reactions    Clindamycin HIVES    Azithromycin RASH    Dicloxacillin DIARRHEA      Current Meds:  Current Outpatient Medications   Medication Sig Dispense Refill    ferrous sulfate 325 (65 FE) MG Oral Tab EC Take 1 tablet (325 mg total) by  mouth every other day.      Prenatal Vit w/Gh-Apouinkfx-LG (PNV OR) Take by mouth daily.      Ergocalciferol (VITAMIN D OR) Take by mouth. (Patient not taking: Reported on 5/15/2024)      Clobetasol Propionate 0.05 % External Cream Apply to AA's of hands and feet BID x 2 weeks. Hold x 1 week. Restart PRN. (Patient not taking: Reported on 2021 ) 60 g 2        HISTORY:  Past Medical History:    Chlamydia    SARAH I (cervical intraepithelial neoplasia I)    Frequent UTI    LGSIL on Pap smear of cervix      Past Surgical History:   Procedure Laterality Date      10/03/2018    Colposcopy,bx cervix/endocerv curr  2011     CIN1    [place] teeth removed  2016      Family History   Problem Relation Age of Onset    Other (Thyroid Cancer [Other]) Father 60    Hypertension Father     Breast Cancer Maternal Grandmother         dx late 60's    Other (Hodgkin's Lymphoma [Other]) Mother 33    Depression Mother         depression    Stroke Maternal Grandfather 39        Comp from back surgery    Skin cancer Paternal Grandfather 86    Heart Disorder Paternal Grandfather 80        Heart attack    Hypertension Paternal Grandfather     Other (Stomach Cancer [Other]) Paternal Grandfather       Social History     Socioeconomic History    Marital status:    Occupational History    Occupation: national response specialist   Tobacco Use    Smoking status: Former     Current packs/day: 0.00     Types: Cigarettes     Start date: 2007     Quit date: 2009     Years since quitting: 15.6    Smokeless tobacco: Never   Vaping Use    Vaping status: Never Used   Substance and Sexual Activity    Alcohol use: No     Alcohol/week: 6.0 standard drinks of alcohol     Types: 6 Standard drinks or equivalent per week    Drug use: No    Sexual activity: Not Currently     Partners: Male   Other Topics Concern    Blood Transfusions No    Caffeine Concern No     Comment: 3 cups per day    Sleep Concern No    Stress Concern No     Weight Concern No    Exercise Yes    Self-Exams Yes     Social Determinants of Health     Financial Resource Strain: Low Risk  (6/24/2024)    Financial Resource Strain     Difficulty of Paying Living Expenses: Not hard at all     Med Affordability: No   Food Insecurity: No Food Insecurity (6/24/2024)    Food Insecurity     Food Insecurity: Never true   Transportation Needs: No Transportation Needs (6/24/2024)    Transportation Needs     Lack of Transportation: No     Car Seat: Yes   Stress: No Stress Concern Present (6/24/2024)    Stress     Feeling of Stress : No   Housing Stability: Low Risk  (6/24/2024)    Housing Stability     Housing Instability: No     Crib or Bassinette: Yes          PHYSICAL EXAMINATION:  /61 (BP Location: Right arm, Patient Position: Sitting, Cuff Size: adult)   Pulse 111   Ht 5' 7\" (1.702 m)   Wt 176 lb (79.8 kg)   BMI 27.57 kg/m²   Physical Exam  Constitutional:       Appearance: Normal appearance.   Abdominal:      Palpations: Abdomen is soft.      Tenderness: There is no abdominal tenderness.   Neurological:      Mental Status: She is alert.   Psychiatric:         Mood and Affect: Mood normal.         Behavior: Behavior normal.         OBSTETRIC ULTRASOUND  The patient had a follow-up growth and BPP ultrasound today which revealed normal interval fetal growth and a BPP of 8/8.   Ultrasound Findings:  Single IUP in cephalic presentation.    Placenta is posterior.   A 3 vessel cord is noted.  Cardiac activity is present at 151 bpm  EFW 2596 g ( 5 lb 12 oz); 81%.    GRADY is  27.2 cm.  MVP is 10.5 cm  BPP is 8/8.     The fetal measurements are consistent with established EDC. No gross ultrasound evidence of structural abnormalities are seen today. The patient understands that ultrasound cannot rule out all structural and chromosomal abnormalities.   See PACS/Imaging Tab For Complete Ultrasound Report  I interpreted the results and reviewed them with the  patient.    DISCUSSION  During her visit we discussed and reviewed the following issues:  ADVANCED MATERNAL AGE    Background  I reviewed with the patient that pregnancies in women of advanced maternal age (35 or older at delivery) are associated with elevated risks. Specifically, there is a higher rate of:  Fetal malformations  Preeclampsia  Gestational diabetes  Intrauterine fetal death       Please see previous [facility] detailed discussion.           1hr gtt  137  AC at 99%.  Recommend 3 hr gtt.    IMPRESSION:  IUP at 32w5d   AMA --NIPT low risk, declines invasive testing   Prior  x 1    RECOMMENDATIONS:  Continue care with Dr. Ramos  Weekly NST's at 32 weeks.  Twice weekly testing at 38 weeks - weekly NST and weekly BPP.  Delivery at 39-40 weeks.  Recommend 3 hr gtt.            Thank you for allowing me to participate in the care of your patient.  Please do not hesitate to contact me if additional questions or concerns arise.      Jill Aden M.D.    30 minutes spent in review of records, patient consultation, documentation and coordination of care.  The relevant clinical matter(s) are summarized above.     Note to patient and family  The 21st Century Cures Act makes medical notes available to patients in the interest of transparency.  However, please be advised that this is a medical document.  It is intended as [id number] communication.  It is written and medical language may contain abbreviations or verbiage that are technical and unfamiliar.  It may appear blunt or direct.  Medical documents are intended to carry relevant information, facts as evident, and the clinical opinion of the practitioner.

## (undated) NOTE — LETTER
2024      Ervin Ramos MD  [address]  [address]  [facility] [address]  Via Outside Provider Messaging  Patient: Jeaneth Connelly  : 1985    Dear Colleague:  Thank you for referring your patient to me for a Maternal Fetal Medicine evaluation. Please see my attached note for my findings and recommendations.      Should you have any questions or concerns, please do not hesitate to contact me at the number listed below.    Best Regards,      Jeremiah Espinoza MD  [facility]  [address]  [facility] [address]  [phone]    cc: No Recipients      [facility] Department of Maternal Fetal Medicine  Patient Name: Jeaneth Connelly  Patient : 1985  Physician: Jeremiah Espinoza MD    Outpatient Maternal-Fetal Medicine Follow-Up    Dear Dr. Ramos    Thank you for requesting ultrasound evaluation and maternal fetal medicine consultation on your patient Jeaneth Connelly.  As you are aware she is a 39 year old female  with a morejon pregnancy and an Estimated Date of Delivery: 24.  She returned to maternal-fetal medicine today for a follow-up visit.  Her history was reviewed from her prior visit and there were no interval changes.    Antepartum Risk Factors  AMA --NIPT low risk, declines invasive testing   Prior  x 1  Mild polyhydramnios    PHYSICAL EXAMINATION:  /65 (BP Location: Right arm, Patient Position: Sitting, Cuff Size: adult)   Pulse 95   Ht 5' 7\" (1.702 m)   Wt 184 lb (83.5 kg)   BMI 28.82 kg/m²   General: alert and oriented, no acute distress  Abdomen: gravid, soft, non-tender  Extremities: non-tender, no edema    OBSTETRIC ULTRASOUND    Ultrasound Findings:  Single IUP in cephalic presentation.    Placenta is posterior.   A 3 vessel cord is noted.  Cardiac activity is present at 153 bpm    MVP is 6.9 cm . GRADY 24.3 cm  BPP is 8/8.       See PACS/Imaging Tab For Complete Ultrasound Report  I interpreted the  results and reviewed them with the patient.    DISCUSSION  During her visit we discussed and reviewed the following issues:  ADVANCED MATERNAL AGE  See prior MFM notes for a detailed review.  She did not desire invasive genetic testing.   She has already obtained a low-risk NPIT result and was appropriately reassured.     POLYHYDRAMNIOS  RESOLVED- high-normal GRADY today    IMPRESSION:  IUP at 38w0d  AMA --NIPT low risk, declines invasive testing   Prior  x 1  Mild polyhydramnios - now high-normal     RECOMMENDATIONS:  Continue care with Dr. Ramos  Weekly NST's at 32 weeks.  Delivery at 39-40 weeks.    Thank you for allowing me to participate in the care of your patient.  Please do not hesitate to contact me if additional questions or concerns arise.      Jeremiah Espinoza M.D.    20 minutes spent in review of records, patient consultation, documentation and coordination of care.  The relevant clinical matter(s) are summarized above.     Note to patient and family  The  Cent[employer] Cures Act makes medical notes available to patients in the interest of transparency.  However, please be advised that this is a medical document.  It is intended as vuem-pt-rbhv communication.  It is written and medical language may contain abbreviations or verbiage that are technical and unfamiliar.  It may appear blunt or direct.  Medical documents are intended to carry relevant information, facts as evident, and the clinical opinion of the practitioner.      Pt here for BPP  +fm noted per patient  Pt denies complaints.

## (undated) NOTE — LETTER
2024      Ervin Ramos MD  [address]  [address]  [facility] [address]  Via Outside Provider Messaging  Patient: Jeaneth Connelly  : 1985    Dear Colleague:  Thank you for referring your patient to me for a Maternal Fetal Medicine evaluation. Please see my attached note for my findings and recommendations.      Should you have any questions or concerns, please do not hesitate to contact me at the number listed below.    Best Regards,      Jill Aden MD  [facility]   [address]  [facility] [id number] [phone]    cc: No Recipients      [facility] Department of Maternal Fetal Medicine  Patient Name: Jeaneth Connelly  Patient : 1985  Physician: Jill Aden MD    Pt here for growth ultrasound/BPP  + FM  Pt states feeling occas BH ctx, denies vag bleeding. Leaking lfuid and regular uterine ctx.    Outpatient Maternal-Fetal Medicine Consultation    Dear Dr. Ramos    Thank you for requesting ultrasound evaluation and maternal fetal medicine consultation on your patient Jeaneth Connelly.  As you are aware she is a 39 year old female  with a morejon pregnancy and an Estimated Date of Delivery: 24.  A maternal-fetal medicine  f/u is today.  Her prenatal records and labs were reviewed.    3hr gtt has not been done.  Feeling overall well.  ROS    HISTORY  OB History    Para Term  AB Living   2 1 1 0 0 1   SAB IAB Ectopic Multiple Live Births   0 0 0 0 1      # Outcome Date GA Lbr Huy/2nd Weight Sex Type Anes PTL Lv   2 Current            1 Term 10/03/18 40w6d  9 lb 2 oz (4.14 kg) M CS-LTranv Spinal N PRASANNA      Complications: Fetal intolerance to labor       Allergies:  Allergies   Allergen Reactions    Clindamycin HIVES    Azithromycin RASH    Dicloxacillin DIARRHEA      Current Meds:  Current Outpatient Medications   Medication Sig Dispense Refill    ferrous sulfate 325 (65 FE) MG Oral Tab EC Take 1 tablet (325 mg  total) by mouth every other day.      Prenatal Vit w/Sq-Xtdedxblt-FA (PNV OR) Take by mouth daily.      Ergocalciferol (VITAMIN D OR) Take by mouth. (Patient not taking: Reported on 5/15/2024)      Clobetasol Propionate 0.05 % External Cream Apply to AA's of hands and feet BID x 2 weeks. Hold x 1 week. Restart PRN. (Patient not taking: Reported on 2021 ) 60 g 2        HISTORY:  Past Medical History:    Chlamydia    SARAH I (cervical intraepithelial neoplasia I)    Frequent UTI    LGSIL on Pap smear of cervix      Past Surgical History:   Procedure Laterality Date      10/03/2018    Colposcopy,bx cervix/endocerv curr  2011     CIN1    [place] teeth removed  2016      Family History   Problem Relation Age of Onset    Other (Thyroid Cancer [Other]) Father 60    Hypertension Father     Breast Cancer Maternal Grandmother         dx late 60's    Other (Hodgkin's Lymphoma [Other]) Mother 33    Depression Mother         depression    Stroke Maternal Grandfather 39        Comp from back surgery    Skin cancer Paternal Grandfather 86    Heart Disorder Paternal Grandfather 80        Heart attack    Hypertension Paternal Grandfather     Other (Stomach Cancer [Other]) Paternal Grandfather       Social History     Socioeconomic History    Marital status:    Occupational History    Occupation: national response specialist   Tobacco Use    Smoking status: Former     Current packs/day: 0.00     Types: Cigarettes     Start date: 2007     Quit date: 2009     Years since quitting: 15.6    Smokeless tobacco: Never   Vaping Use    Vaping status: Never Used   Substance and Sexual Activity    Alcohol use: No     Alcohol/week: 6.0 standard drinks of alcohol     Types: 6 Standard drinks or equivalent per week    Drug use: No    Sexual activity: Not Currently     Partners: Male   Other Topics Concern    Blood Transfusions No    Caffeine Concern No     Comment: 3 cups per day    Sleep Concern No    Stress  Concern No    Weight Concern No    Exercise Yes    Self-Exams Yes     Social Determinants of Health     Financial Resource Strain: Low Risk  (6/24/2024)    Financial Resource Strain     Difficulty of Paying Living Expenses: Not hard at all     Med Affordability: No   Food Insecurity: No Food Insecurity (6/24/2024)    Food Insecurity     Food Insecurity: Never true   Transportation Needs: No Transportation Needs (6/24/2024)    Transportation Needs     Lack of Transportation: No     Car Seat: Yes   Stress: No Stress Concern Present (6/24/2024)    Stress     Feeling of Stress : No   Housing Stability: Low Risk  (6/24/2024)    Housing Stability     Housing Instability: No     Crib or Bassinette: Yes          PHYSICAL EXAMINATION:  /62 (BP Location: Right arm, Patient Position: Sitting, Cuff Size: adult)   Pulse 90   Ht 5' 7\" (1.702 m)   Wt 181 lb (82.1 kg)   BMI 28.35 kg/m²   Physical Exam  Constitutional:       Appearance: Normal appearance.   Abdominal:      Palpations: Abdomen is soft.      Tenderness: There is no abdominal tenderness.   Neurological:      Mental Status: She is alert.   Psychiatric:         Mood and Affect: Mood normal.         Behavior: Behavior normal.           OBSTETRIC ULTRASOUND  The patient had a follow-up growth and BPP ultrasound today which revealed normal interval fetal growth and a BPP of 8/8.   Ultrasound Findings:  Single IUP in cephalic presentation.    Placenta is posterior.   A 3 vessel cord is noted.  Cardiac activity is present at 123 bpm  EFW 3672 g ( 8 lb 2 oz); 91%.    GRADY is  15.3 cm.  MVP is 6.4 cm  BPP is 8/8.     The fetal measurements are consistent with large for gestational age fetus. No gross ultrasound evidence of structural abnormalities are seen today. The patient understands that ultrasound cannot rule out all structural and chromosomal abnormalities.   See PACS/Imaging Tab For Complete Ultrasound Report  I interpreted the results and reviewed them with the  patient.    DISCUSSION  During her visit we discussed and reviewed the following issues:  ADVANCED MATERNAL AGE    Background  I reviewed with the patient that pregnancies in women of advanced maternal age (35 or older at delivery) are associated with elevated risks. Specifically, there is a higher rate of:  Fetal malformations  Preeclampsia  Gestational diabetes  Intrauterine fetal death       Please see previous M detailed discussion.     Polyhydramnios discussed at visit  weeks ago.  Could be due to beg fetus or undiagnosed GDM.      1hr gtt  137  Large for gestational age fetus with polyhydramnios in AMA patient .  Recommend 3 hr gtt.      IMPRESSION:  IUP at 36w5d   AMA --NIPT low risk, declines invasive testing   Prior  x 1  Mild polyhydramnios    RECOMMENDATIONS:  Continue care with Dr. Ramos  Weekly NST's at 32 weeks.  Twice weekly testing at 38 weeks - weekly NST and weekly BPP.  Delivery at 39-40 weeks.  Recommend 3 hr gtt. Alternatively, could consider glucose check on baby if  weight under what would meet criteria for glucose checks by weight.        Thank you for allowing me to participate in the care of your patient.  Please do not hesitate to contact me if additional questions or concerns arise.      Jill Aden M.D.    30 minutes spent in review of records, patient consultation, documentation and coordination of care.  The relevant clinical matter(s) are summarized above.     Note to patient and family  The 21st Century Cures Act makes medical notes available to patients in the interest of transparency.  However, please be advised that this is a medical document.  It is intended as pclh-hx-xluu communication.  It is written and medical language may contain abbreviations or verbiage that are technical and unfamiliar.  It may appear blunt or direct.  Medical documents are intended to carry relevant information, facts as evident, and the clinical opinion of the  practitioner.

## (undated) NOTE — LETTER
[facility]  [address] [facility], [address] [place] [address]    Consent for Operation    Date: __________________    Time: _______________    1.  I authorize the performance upon Vidal Cueto the following operation:                              Luis 5. I consent to the photographing or videotaping of the operations or procedures to be performed, including appropriate portions of my body for medical, scientific, or educational purposes, provided my identity is not revealed by the pictures or by descrip 5. My surgeon/physician has discussed the potential benefits, risks, and side effects of this procedure; the likelihood of achieving goals; and potential problems that might occur during recuperation.  They also discussed reasonable alternatives to the proc a. Allow the anesthesiologist (anesthesia doctor) to give me medicine and do additional procedures as necessary.  Some examples are: Starting or using an “IV” to give memedicine, fluids or blood during my procedure, and having a breathing tube placed to hel 7. Regional Anesthesia (“spinal”, “epidural”, & “nerve blocks”): I understand that rare but potential complications include headache, bleeding, infection, seizure, irregular heart rhythms, and nerve injury.     I can change my mind about having anesthesia

## (undated) NOTE — LETTER
May 15, 2024      Ervin Ramos MD  [address]  [address]  [facility] [address]  Via Outside Provider Messaging  Patient: Jeaneth Connelly  : 1985    Dear Colleague:  Thank you for referring your patient to me for a Maternal Fetal Medicine evaluation. Please see my attached note for my findings and recommendations.      Should you have any questions or concerns, please do not hesitate to contact me at the number listed below.    Best Regards,      Jill Aden MD  [facility]  [address]  [facility] [address]  [phone]    cc: No Recipients      [facility] Department of Maternal Fetal Medicine  Patient Name: Jeaneth Connelly  Patient : 1985  Physician: Jill Aden MD    Pt here for Level II Ultrasound  +fm noted per patient  Pt denies complaints.     Outpatient Maternal-Fetal Medicine Consultation    Dear Dr. Ramos    Thank you for requesting ultrasound evaluation and maternal fetal medicine consultation on your patient Jeaneth Connelly.  As you are aware she is a 39 year old female  with a morejon pregnancy and an Estimated Date of Delivery: 24.  A maternal-fetal medicine consultation was requested secondary to Advanced Maternal Age.  Her prenatal records and labs were reviewed.    ROS    HISTORY  OB History    Para Term  AB Living   2 1 1 0 0 1   SAB IAB Ectopic Multiple Live Births   0 0 0 0 1      # Outcome Date GA Lbr Huy/2nd Weight Sex Type Anes PTL Lv   2 Current            1 Term 10/03/18 40w6d  9 lb 2 oz (4.14 kg) M CS-LTranv Spinal N PRASANNA      Complications: Fetal intolerance to labor       Allergies:  Allergies   Allergen Reactions    Clindamycin HIVES    Dicloxacillin DIARRHEA      Current Meds:  Current Outpatient Medications   Medication Sig Dispense Refill    Prenatal Vit w/Od-Dkwocmojv-FK (PNV OR) Take by mouth daily.      Ergocalciferol (VITAMIN D OR) Take by mouth. (Patient not taking: Reported on  5/15/2024)      Clobetasol Propionate 0.05 % External Cream Apply to AA's of hands and feet BID x 2 weeks. Hold x 1 week. Restart PRN. (Patient not taking: Reported on 2021 ) 60 g 2        HISTORY:  Past Medical History:    Chlamydia    SARAH I (cervical intraepithelial neoplasia I)    Frequent UTI    LGSIL on Pap smear of cervix      Past Surgical History:   Procedure Laterality Date      10/03/2018    Colposcopy,bx cervix/endocerv curr  2011     CIN1    [place] teeth removed  2016      Family History   Problem Relation Age of Onset    Other (Thyroid Cancer [Other]) Father 60    Hypertension Father     Breast Cancer Maternal Grandmother         dx late 60's    Other (Hodgkin's Lymphoma [Other]) Mother 33    Depression Mother         depression    Stroke Maternal Grandfather 39        Comp from back surgery    Skin cancer Paternal Grandfather 86    Heart Disorder Paternal Grandfather 80        Heart attack    Hypertension Paternal Grandfather     Other (Stomach Cancer [Other]) Paternal Grandfather       Social History     Socioeconomic History    Marital status:    Occupational History    Occupation: national response specialist   Tobacco Use    Smoking status: Former     Current packs/day: 0.00     Types: Cigarettes     Start date: 2007     Quit date: 2009     Years since quitting: 15.3    Smokeless tobacco: Never   Vaping Use    Vaping status: Never Used   Substance and Sexual Activity    Alcohol use: No     Alcohol/week: 6.0 standard drinks of alcohol     Types: 6 Standard drinks or equivalent per week    Drug use: No    Sexual activity: Not Currently     Partners: Male   Other Topics Concern    Blood Transfusions No    Caffeine Concern No     Comment: 3 cups per day    Sleep Concern No    Stress Concern No    Weight Concern No    Exercise Yes    Self-Exams Yes     Social Determinants of Health      Received from [facility]    Housing Stability           PHYSICAL EXAMINATION:  /64 (BP Location: Right arm, Patient Position: Sitting, Cuff Size: adult)   Pulse 91   Ht 5' 7\" (1.702 m)   Wt 152 lb (68.9 kg)   BMI 23.81 kg/m²   Physical Exam  Constitutional:       Appearance: Normal appearance.   Abdominal:      Palpations: Abdomen is soft.      Tenderness: There is no abdominal tenderness.   Neurological:      Mental Status: She is alert.   Psychiatric:         Mood and Affect: Mood normal.         Behavior: Behavior normal.         OBSTETRIC ULTRASOUND  The patient had a level II ultrasound today which revealed size consistent with dates and a normal detailed anatomic survey.  Ultrasound Findings:  Single IUP in breech presentation.    Placenta is posterior.   A 3 vessel cord is noted.  Cardiac activity is present at 156 bpm   g ( 1 lb 0 oz);   MVP is 3.9 cm .     The fetal measurements are consistent with the established EDC. No ultrasound evidence of structural abnormalities are seen today. The nasal bone is present. No ultrasound evidence of markers for aneuploidy are seen. She understands that ultrasound exam cannot exclude genetic abnormalities and that genetic testing is recommended. The limitations of ultrasound were discussed.     Uterus and adnexa appeared normal  today on US  See PACS/Imaging Tab For Complete Ultrasound Report  I interpreted the results and reviewed them with the patient.    DISCUSSION  During her visit we discussed and reviewed the following issues:  ADVANCED MATERNAL AGE    Background  I reviewed with the patient that pregnancies in women of advanced maternal age (35 or older at delivery) are associated with elevated risks. Specifically, there is a higher rate of:  Fetal malformations  Preeclampsia  Gestational diabetes  Intrauterine fetal death    As a result, enhanced pregnancy surveillance is advised for these patients including a comprehensive ultrasound to assess for fetal malformations (at 20 weeks) and a third  trimester ultrasound assessment for fetal growth (at 32 weeks). In addition, weekly NST's (initiating at 36 weeks gestation for women 35-39 years and at 32 weeks gestation for women 40 years and older) are also advised. Routine obstetric care is more than adequate to assess for gestational diabetes and preeclampsia; hence, no further significant alterations in obstetric care are advised.    Medical Complications    Women 35 years of age or older can expect to experience two to three fold higher rates of hospitalization,  delivery, and pregnancy-related complications when compared to their younger counterparts.  The two most common medical problems complicating these  pregnanccies are hypertension and diabetes.   The incidence of preeclampsia in the general obstetric population is 3 to 4 percent; this increases to 5 to 10 percent in women over age 40 and is as high as 35 percent in women over age 50.   The incidence of gestational diabetes in the general obstetric population is 3 percent, rising to 7 to 12 percent in women over age 40 and 20 percent in women over age 50.  Women 35 years of age or older are more likely to be delivered by . The  delivery rate in the general obstetric population of the [place] is almost 30 percent, compared to almost 50 percent in women over age 40 to 45 and almost 80 percent in women age 50 to 63.          Fetal Death        A decision analysis tool using data from the [place] [employer]  Database predicted a strategy of weekly antepartum testing and labor induction would lower the risk of unexplained fetal death in women 35 years of age or older. In this model, weekly testing starting at 36 weeks of gestation would drop the risk of fetal death from 5.2 to 1.3 per 1000 pregnancies. While a policy of antepartum testing in older women does increase the chance that a women will be induced (71 inductions per fetal death averted) and thereby increases  her risk of having a  delivery, only 14 additional cesareans would need to be performed to avert one unexplained fetal death.  Hence, weekly NST's are advised for women of advanced maternal age; testing should be initiated at 36 weeks for women 35-39 years and at 32 weeks for women 40 years and older.    Fetal Malformations    Cardiac malformations, clubfoot, and diaphragmatic hernia appear to occur with increased frequency in offspring of older women. These abnormalities are structural and unrelated to aneuploidy, thus they would not be detected by karyotype analysis.  For these reasons a complete, detailed ultrasound (level II) is advised even if the fetus has a normal karyotype.      Fetal Aneuploidy      Invasive Testing  I offered invasive genetic testing (amniocentesis, chorionic villus sampling) after reviewing the diagnostic accuracy of these tests as well as the procedure associated loss rate (1:500 for genetic amniocentesis).    She ultimately does not desire invasive genetic testing.     We discussed  the increased risk of chromosomal abnormalities associated with advanced maternal age at age  39 year old. She understands that ultrasound exam cannot exclude potential genetic abnormalities.  Her estimated risk based on maternal age at term with any chromosome abnormality is about 1: 65 and with Down Syndrome is about 1: 85.   We also discussed the risks and benefits of having  genetic testing ([facility] and amniocentesis) performed.      Non-invasive Pregnancy Testing (NIPT)  I reviewed current non-invasive screening options. Currently non-invasive pregnancy testing (NIPT) offers the highest detection rate (with the lowest false positive rate) for the detection of fetal aneuploidy amongst high-risk patients. The limitations of detailed mid-trimester sonography was reviewed with the patient. First trimester screening and second trimester multiple-marker serum serum screening as alternative aneuploidy  screening options were also reviewed. However, both of these tests are associated with lower detection and higher false positive rates.              IMPRESSION:  IUP at 20w5d   AMA --NIPT low risk, declines invasive testing   Prior  x 1    RECOMMENDATIONS:  Continue care with Dr. Ramos  Follow-up Growth ultrasound with BPP at 32 weeks.  Weekly NST's at 32 weeks.  Twice weekly testing at 38 weeks - weekly NST and weekly BPP.  Delivery at 39-40 weeks.            Thank you for allowing me to participate in the care of your patient.  Please do not hesitate to contact me if additional questions or concerns arise.      Jill Aden M.D.    30 minutes spent in review of records, patient consultation, documentation and coordination of care.  The relevant clinical matter(s) are summarized above.     Note to patient and family  The 21st Century Cures Act makes medical notes available to patients in the interest of transparency.  However, please be advised that this is a medical document.  It is intended as lmlm-ts-yfih communication.  It is written and medical language may contain abbreviations or verbiage that are technical and unfamiliar.  It may appear blunt or direct.  Medical documents are intended to carry relevant information, facts as evident, and the clinical opinion of the practitioner.